# Patient Record
Sex: FEMALE | Race: WHITE | Employment: UNEMPLOYED | URBAN - METROPOLITAN AREA
[De-identification: names, ages, dates, MRNs, and addresses within clinical notes are randomized per-mention and may not be internally consistent; named-entity substitution may affect disease eponyms.]

---

## 2018-10-23 LAB
CHOLESTEROL, TOTAL: 226 MG/DL
CHOLESTEROL/HDL RATIO: 4
HDLC SERPL-MCNC: 56 MG/DL (ref 35–70)
LDL CHOLESTEROL CALCULATED: 147 MG/DL (ref 0–160)
TRIGL SERPL-MCNC: 111 MG/DL
VLDLC SERPL CALC-MCNC: NORMAL MG/DL

## 2019-06-05 RX ORDER — BUPROPION HYDROCHLORIDE 150 MG/1
150 TABLET, EXTENDED RELEASE ORAL 2 TIMES DAILY
COMMUNITY
End: 2019-07-15 | Stop reason: DRUGHIGH

## 2019-06-05 RX ORDER — PREDNISONE 10 MG/1
TABLET ORAL
COMMUNITY
Start: 2019-04-22 | End: 2019-06-10

## 2019-06-05 SDOH — HEALTH STABILITY: MENTAL HEALTH: HOW OFTEN DO YOU HAVE A DRINK CONTAINING ALCOHOL?: NEVER

## 2019-06-10 ENCOUNTER — HOSPITAL ENCOUNTER (OUTPATIENT)
Age: 44
Discharge: HOME | End: 2019-06-12
Payer: COMMERCIAL

## 2019-06-10 ENCOUNTER — OFFICE VISIT (OUTPATIENT)
Dept: FAMILY MEDICINE CLINIC | Age: 44
End: 2019-06-10
Payer: COMMERCIAL

## 2019-06-10 VITALS
OXYGEN SATURATION: 98 % | HEART RATE: 69 BPM | TEMPERATURE: 99.7 F | DIASTOLIC BLOOD PRESSURE: 78 MMHG | WEIGHT: 213 LBS | BODY MASS INDEX: 32.28 KG/M2 | SYSTOLIC BLOOD PRESSURE: 124 MMHG | HEIGHT: 68 IN

## 2019-06-10 DIAGNOSIS — F32.1 CURRENT MODERATE EPISODE OF MAJOR DEPRESSIVE DISORDER WITHOUT PRIOR EPISODE (HCC): Primary | ICD-10-CM

## 2019-06-10 DIAGNOSIS — E66.9 OBESITY (BMI 30.0-34.9): ICD-10-CM

## 2019-06-10 DIAGNOSIS — F32.1 CURRENT MODERATE EPISODE OF MAJOR DEPRESSIVE DISORDER WITHOUT PRIOR EPISODE (HCC): ICD-10-CM

## 2019-06-10 DIAGNOSIS — F41.9 ANXIETY: ICD-10-CM

## 2019-06-10 LAB
ALBUMIN SERPL-MCNC: 4.1 G/DL (ref 3.5–5.2)
ALP BLD-CCNC: 96 U/L (ref 35–104)
ALT SERPL-CCNC: 28 U/L (ref 0–32)
ANION GAP SERPL CALCULATED.3IONS-SCNC: 10 MMOL/L (ref 7–16)
AST SERPL-CCNC: 22 U/L (ref 0–31)
BILIRUB SERPL-MCNC: <0.2 MG/DL (ref 0–1.2)
BUN BLDV-MCNC: 13 MG/DL (ref 6–20)
CALCIUM SERPL-MCNC: 9.4 MG/DL (ref 8.6–10.2)
CHLORIDE BLD-SCNC: 104 MMOL/L (ref 98–107)
CO2: 26 MMOL/L (ref 22–29)
CREAT SERPL-MCNC: 0.9 MG/DL (ref 0.5–1)
GFR AFRICAN AMERICAN: >60
GFR NON-AFRICAN AMERICAN: >60 ML/MIN/1.73
GLUCOSE BLD-MCNC: 87 MG/DL (ref 74–99)
POTASSIUM SERPL-SCNC: 4.4 MMOL/L (ref 3.5–5)
SODIUM BLD-SCNC: 140 MMOL/L (ref 132–146)
TOTAL PROTEIN: 7.3 G/DL (ref 6.4–8.3)
TSH SERPL DL<=0.05 MIU/L-ACNC: 2.17 UIU/ML (ref 0.27–4.2)

## 2019-06-10 PROCEDURE — 84443 ASSAY THYROID STIM HORMONE: CPT

## 2019-06-10 PROCEDURE — 99214 OFFICE O/P EST MOD 30 MIN: CPT | Performed by: INTERNAL MEDICINE

## 2019-06-10 PROCEDURE — 80053 COMPREHEN METABOLIC PANEL: CPT

## 2019-06-10 PROCEDURE — 36415 COLL VENOUS BLD VENIPUNCTURE: CPT

## 2019-06-10 RX ORDER — FLUOXETINE HYDROCHLORIDE 20 MG/1
CAPSULE ORAL
Qty: 90 CAPSULE | Refills: 1 | Status: SHIPPED | OUTPATIENT
Start: 2019-06-10 | End: 2019-07-15 | Stop reason: DRUGHIGH

## 2019-06-10 ASSESSMENT — ENCOUNTER SYMPTOMS
BACK PAIN: 0
EYE REDNESS: 1
VOMITING: 0
DIARRHEA: 0
SHORTNESS OF BREATH: 0
ROS SKIN COMMENTS: SEE ABOVE
ABDOMINAL PAIN: 0
WHEEZING: 0
CONSTIPATION: 0
COUGH: 0
BLOOD IN STOOL: 0
NAUSEA: 0

## 2019-06-10 NOTE — PROGRESS NOTES
[id number] [employer] [facility]     6/10/19  Nisha Porter : 1975 Sex: female  Age: 53 y.o. Chief Complaint   Patient presents with    Discuss Medications     3mos   Follow-up medical problems    HPI patient presents today accompanied by her  for follow-up visit on her medical problems. As I seen her she has seen GYN. Rash that we saw last time and treated with prednisone has come back a couple times and gone. She associates it with her menstrual periods. Unusual and that it only affects the back. GYN had no answer for it. Did have an abnormality on her mammogram requiring ultrasound. Nothing of significance was found however they did recommend getting a mammogram 6 months after the last  to look for any changes given this mammogram was her first.  Is complaining of Wellbutrin not working for her.  states it tends to make her mean. She states although the last dose of Prozac was not working well it was working better than what she is on currently and wishes to go back on Prozac with potentially raising her dose slightly. Review of Systems   Constitutional: Negative for activity change, appetite change, chills, diaphoresis, fatigue, fever and unexpected weight change. HENT: Negative for congestion. Eyes: Positive for redness. Related to allergies. Respiratory: Negative for cough, shortness of breath and wheezing. Cardiovascular: Negative for chest pain, palpitations and leg swelling. Gastrointestinal: Negative for abdominal pain, blood in stool, constipation, diarrhea, nausea and vomiting. Endocrine: Negative. Genitourinary: Negative for difficulty urinating, dysuria, frequency, hematuria and urgency. She does admit to some menstrual irregularities. Again she did just see GYN. She was told everything was okay. Musculoskeletal: Negative for arthralgias, back pain, gait problem and myalgias. Skin: Positive for rash.         See above Allergic/Immunologic: Positive for environmental allergies. Negative for immunocompromised state. Neurological: Negative for dizziness, weakness, light-headedness, numbness and headaches. Hematological: Negative. Psychiatric/Behavioral: Positive for dysphoric mood. The patient is nervous/anxious. REST OF PERTINENT ROS GONE OVER AND WAS NEGATIVE. PMH:  Health Maintenance:  Mammogram - (2019)  Mammogram Screening - (2019)  Pelvic/Pap Exam - Declining  Medical Problems:  Obesity, Depression, Anxiety  (Surgeries)  FH: Father:  Lung Cancer - . Mother:  Hypothyroidism, glucose intolerance. Brother 1:  Hyperlipidemia. Sister 1:  PCOS. Sister 2:  Alcoholism. SH:  . (Marital)homemaker  Personal Habits: Cigarette Use: current cigarette smoker. Alcohol: Denies alcohol use. . (Drug  Use)    Current Outpatient Medications:     FLUoxetine (PROZAC) 20 MG capsule, Take 3 pills by mouth daily, Disp: 90 capsule, Rfl: 1    buPROPion (WELLBUTRIN SR) 150 MG extended release tablet, Take 150 mg by mouth 2 times daily, Disp: , Rfl:   Allergies   Allergen Reactions    Seasonal        Past Medical History:   Diagnosis Date    Anxiety     Depression     Obesity      History reviewed. No pertinent surgical history. History reviewed. No pertinent family history.   Social History     Socioeconomic History    Marital status:      Spouse name: Not on file    Number of children: Not on file    Years of education: Not on file    Highest education level: Not on file   Occupational History    Not on file   Social Needs    Financial resource strain: Not on file    Food insecurity:     Worry: Not on file     Inability: Not on file    Transportation needs:     Medical: Not on file     Non-medical: Not on file   Tobacco Use    Smoking status: Current Every Day Smoker     Packs/day: 1.00     Years: 16.00     Pack years: 16.00     Types: Cigarettes    Smokeless tobacco: Never Used   Substance and Sexual Activity    Alcohol use: Never     Frequency: Never    Drug use: Not on file    Sexual activity: Not on file   Lifestyle    Physical activity:     Days per week: Not on file     Minutes per session: Not on file    Stress: Not on file   Relationships    Social connections:     Talks on phone: Not on file     Gets together: Not on file     Attends Hindu service: Not on file     Active member of club or organization: Not on file     Attends meetings of clubs or organizations: Not on file     Relationship status: Not on file    Intimate partner violence:     Fear of current or ex partner: Not on file     Emotionally abused: Not on file     Physically abused: Not on file     Forced sexual activity: Not on file   Other Topics Concern    Not on file   Social History Narrative    Not on file       Vitals:    06/10/19 1045   BP: 124/78   Pulse: 69   Temp: 99.7 °F (37.6 °C)   TempSrc: Temporal   SpO2: 98%   Weight: 213 lb (96.6 kg)   Height: 5' 8\" (1.727 m)       Physical Exam   Constitutional: She is oriented to person, place, and time. She appears well-developed and well-nourished. No distress. Neck: Normal range of motion. Neck supple. No JVD present. No thyromegaly present. Cardiovascular: Normal rate, regular rhythm, normal heart sounds and intact distal pulses. Exam reveals no gallop and no friction rub. No murmur heard. Pulmonary/Chest: Effort normal and breath sounds normal. No respiratory distress. She has no wheezes. She has no rales. Abdominal: Soft. Bowel sounds are normal. She exhibits no distension. There is no tenderness. Musculoskeletal: Normal range of motion. Lymphadenopathy:     She has no cervical adenopathy. Neurological: She is alert and oriented to person, place, and time. Skin: Skin is warm and dry. No rash noted. No erythema. Psychiatric: She has a normal mood and affect. Her behavior is normal.   Nursing note and vitals reviewed.       Assessment and Plan:  Jamal Loja was seen today for discuss medications. Diagnoses and all orders for this visit:    Current moderate episode of major depressive disorder without prior episode ([address]  -     TSH without Reflex; Future  -     COMPREHENSIVE METABOLIC PANEL; Future    Anxiety    Obesity (BMI 30.0-34.9)    Other orders  -     FLUoxetine (PROZAC) 20 MG capsule; Take 3 pills by mouth daily    Plan: I will see her back in 6 weeks. At that time we will set her up for mammogram repeat to be done in October timeframe to follow-up on previous. Lab work today to monitor disease progression and medication use. We will check a limited blood count when I see her next time as well as we are weaning her off of Wellbutrin and restarting Prozac 20 mg a day for week 40 mg a day for a week then 60 mg daily. Notify us with problems in the interim. Weight loss attempts. Follow-up with GYN. Return in about 6 weeks (around 7/22/2019). Seen By:  Sami Abdi MD      *Document was created using voice recognition software. Note was reviewed however may contain grammatical errors.

## 2019-06-11 ENCOUNTER — TELEPHONE (OUTPATIENT)
Dept: PRIMARY CARE CLINIC | Age: 44
End: 2019-06-11

## 2019-07-10 RX ORDER — BUPROPION HYDROCHLORIDE 150 MG/1
150 TABLET, EXTENDED RELEASE ORAL
COMMUNITY
End: 2019-07-15 | Stop reason: DRUGHIGH

## 2019-07-10 RX ORDER — PREDNISONE 10 MG/1
TABLET ORAL
COMMUNITY
End: 2019-08-16

## 2019-07-15 ENCOUNTER — OFFICE VISIT (OUTPATIENT)
Dept: FAMILY MEDICINE CLINIC | Age: 44
End: 2019-07-15
Payer: COMMERCIAL

## 2019-07-15 VITALS
HEART RATE: 65 BPM | OXYGEN SATURATION: 96 % | DIASTOLIC BLOOD PRESSURE: 78 MMHG | WEIGHT: 204 LBS | BODY MASS INDEX: 30.92 KG/M2 | HEIGHT: 68 IN | SYSTOLIC BLOOD PRESSURE: 122 MMHG

## 2019-07-15 DIAGNOSIS — F41.9 ANXIETY: Primary | ICD-10-CM

## 2019-07-15 PROCEDURE — 99213 OFFICE O/P EST LOW 20 MIN: CPT | Performed by: INTERNAL MEDICINE

## 2019-07-15 RX ORDER — FLUOXETINE HYDROCHLORIDE 20 MG/1
60 CAPSULE ORAL DAILY
Qty: 90 CAPSULE | Refills: 5 | Status: SHIPPED | OUTPATIENT
Start: 2019-07-15 | End: 2019-11-04

## 2019-07-15 ASSESSMENT — PATIENT HEALTH QUESTIONNAIRE - PHQ9
SUM OF ALL RESPONSES TO PHQ QUESTIONS 1-9: 0
SUM OF ALL RESPONSES TO PHQ QUESTIONS 1-9: 0
SUM OF ALL RESPONSES TO PHQ9 QUESTIONS 1 & 2: 0
2. FEELING DOWN, DEPRESSED OR HOPELESS: 0
1. LITTLE INTEREST OR PLEASURE IN DOING THINGS: 0

## 2019-07-18 ENCOUNTER — PATIENT MESSAGE (OUTPATIENT)
Dept: FAMILY MEDICINE CLINIC | Age: 44
End: 2019-07-18

## 2019-08-12 ENCOUNTER — HOSPITAL ENCOUNTER (EMERGENCY)
Age: 44
Discharge: HOME | End: 2019-08-12
Attending: EMERGENCY MEDICINE
Payer: COMMERCIAL

## 2019-08-12 VITALS
WEIGHT: 195 LBS | HEIGHT: 68 IN | TEMPERATURE: 98.5 F | RESPIRATION RATE: 16 BRPM | SYSTOLIC BLOOD PRESSURE: 134 MMHG | BODY MASS INDEX: 29.55 KG/M2 | DIASTOLIC BLOOD PRESSURE: 80 MMHG | HEART RATE: 80 BPM | OXYGEN SATURATION: 98 %

## 2019-08-12 DIAGNOSIS — R10.13 DYSPEPSIA: Primary | ICD-10-CM

## 2019-08-12 DIAGNOSIS — R19.7 DIARRHEA, UNSPECIFIED TYPE: ICD-10-CM

## 2019-08-12 LAB
ALBUMIN SERPL-MCNC: 4 G/DL (ref 3.5–5.2)
ALP BLD-CCNC: 121 U/L (ref 35–104)
ALT SERPL-CCNC: 23 U/L (ref 0–32)
ANION GAP SERPL CALCULATED.3IONS-SCNC: 12 MMOL/L (ref 7–16)
AST SERPL-CCNC: 21 U/L (ref 0–31)
BACTERIA: NORMAL /HPF
BASOPHILS ABSOLUTE: 0.06 E9/L (ref 0–0.2)
BASOPHILS RELATIVE PERCENT: 0.8 % (ref 0–2)
BILIRUB SERPL-MCNC: 0.2 MG/DL (ref 0–1.2)
BILIRUBIN DIRECT: <0.2 MG/DL (ref 0–0.3)
BILIRUBIN URINE: NEGATIVE
BILIRUBIN, INDIRECT: ABNORMAL MG/DL (ref 0–1)
BLOOD, URINE: ABNORMAL
BUN BLDV-MCNC: 6 MG/DL (ref 6–20)
CALCIUM SERPL-MCNC: 9.5 MG/DL (ref 8.6–10.2)
CHLORIDE BLD-SCNC: 103 MMOL/L (ref 98–107)
CLARITY: CLEAR
CO2: 25 MMOL/L (ref 22–29)
COLOR: YELLOW
CREAT SERPL-MCNC: 0.8 MG/DL (ref 0.5–1)
EOSINOPHILS ABSOLUTE: 0.13 E9/L (ref 0.05–0.5)
EOSINOPHILS RELATIVE PERCENT: 1.7 % (ref 0–6)
GFR AFRICAN AMERICAN: >60
GFR NON-AFRICAN AMERICAN: >60 ML/MIN/1.73
GLUCOSE BLD-MCNC: 99 MG/DL (ref 74–99)
GLUCOSE URINE: NEGATIVE MG/DL
HCG(URINE) PREGNANCY TEST: NEGATIVE
HCT VFR BLD CALC: 40.5 % (ref 34–48)
HEMOGLOBIN: 12.8 G/DL (ref 11.5–15.5)
IMMATURE GRANULOCYTES #: 0.04 E9/L
IMMATURE GRANULOCYTES %: 0.5 % (ref 0–5)
KETONES, URINE: NEGATIVE MG/DL
LEUKOCYTE ESTERASE, URINE: NEGATIVE
LIPASE: 16 U/L (ref 13–60)
LYMPHOCYTES ABSOLUTE: 1.25 E9/L (ref 1.5–4)
LYMPHOCYTES RELATIVE PERCENT: 16.8 % (ref 20–42)
MCH RBC QN AUTO: 29 PG (ref 26–35)
MCHC RBC AUTO-ENTMCNC: 31.6 % (ref 32–34.5)
MCV RBC AUTO: 91.8 FL (ref 80–99.9)
MONOCYTES ABSOLUTE: 0.58 E9/L (ref 0.1–0.95)
MONOCYTES RELATIVE PERCENT: 7.8 % (ref 2–12)
NEUTROPHILS ABSOLUTE: 5.37 E9/L (ref 1.8–7.3)
NEUTROPHILS RELATIVE PERCENT: 72.4 % (ref 43–80)
NITRITE, URINE: NEGATIVE
PDW BLD-RTO: 13.1 FL (ref 11.5–15)
PH UA: 6 (ref 5–9)
PLATELET # BLD: 315 E9/L (ref 130–450)
PMV BLD AUTO: 11.3 FL (ref 7–12)
POTASSIUM REFLEX MAGNESIUM: 3.9 MMOL/L (ref 3.5–5)
PROTEIN UA: NEGATIVE MG/DL
RBC # BLD: 4.41 E12/L (ref 3.5–5.5)
RBC UA: NORMAL /HPF (ref 0–2)
SODIUM BLD-SCNC: 140 MMOL/L (ref 132–146)
SPECIFIC GRAVITY UA: <=1.005 (ref 1–1.03)
TOTAL PROTEIN: 7.4 G/DL (ref 6.4–8.3)
UROBILINOGEN, URINE: 0.2 E.U./DL
WBC # BLD: 7.4 E9/L (ref 4.5–11.5)
WBC UA: NORMAL /HPF (ref 0–5)

## 2019-08-12 PROCEDURE — 96375 TX/PRO/DX INJ NEW DRUG ADDON: CPT

## 2019-08-12 PROCEDURE — 2500000003 HC RX 250 WO HCPCS: Performed by: NURSE PRACTITIONER

## 2019-08-12 PROCEDURE — 80048 BASIC METABOLIC PNL TOTAL CA: CPT

## 2019-08-12 PROCEDURE — 96374 THER/PROPH/DIAG INJ IV PUSH: CPT

## 2019-08-12 PROCEDURE — 80076 HEPATIC FUNCTION PANEL: CPT

## 2019-08-12 PROCEDURE — 81025 URINE PREGNANCY TEST: CPT

## 2019-08-12 PROCEDURE — 85025 COMPLETE CBC W/AUTO DIFF WBC: CPT

## 2019-08-12 PROCEDURE — 2580000003 HC RX 258: Performed by: NURSE PRACTITIONER

## 2019-08-12 PROCEDURE — 99284 EMERGENCY DEPT VISIT MOD MDM: CPT

## 2019-08-12 PROCEDURE — 83690 ASSAY OF LIPASE: CPT

## 2019-08-12 PROCEDURE — 81001 URINALYSIS AUTO W/SCOPE: CPT

## 2019-08-12 PROCEDURE — 96361 HYDRATE IV INFUSION ADD-ON: CPT

## 2019-08-12 PROCEDURE — 6360000002 HC RX W HCPCS: Performed by: NURSE PRACTITIONER

## 2019-08-12 PROCEDURE — 36415 COLL VENOUS BLD VENIPUNCTURE: CPT

## 2019-08-12 PROCEDURE — 6370000000 HC RX 637 (ALT 250 FOR IP): Performed by: EMERGENCY MEDICINE

## 2019-08-12 RX ORDER — FAMOTIDINE 20 MG/1
20 TABLET, FILM COATED ORAL 2 TIMES DAILY
Qty: 28 TABLET | Refills: 0 | Status: SHIPPED | OUTPATIENT
Start: 2019-08-12 | End: 2019-08-22 | Stop reason: ALTCHOICE

## 2019-08-12 RX ORDER — 0.9 % SODIUM CHLORIDE 0.9 %
1000 INTRAVENOUS SOLUTION INTRAVENOUS ONCE
Status: COMPLETED | OUTPATIENT
Start: 2019-08-12 | End: 2019-08-12

## 2019-08-12 RX ORDER — ONDANSETRON 2 MG/ML
4 INJECTION INTRAMUSCULAR; INTRAVENOUS ONCE
Status: COMPLETED | OUTPATIENT
Start: 2019-08-12 | End: 2019-08-12

## 2019-08-12 RX ADMIN — FAMOTIDINE 20 MG: 10 INJECTION, SOLUTION INTRAVENOUS at 11:57

## 2019-08-12 RX ADMIN — LIDOCAINE HYDROCHLORIDE: 20 SOLUTION ORAL; TOPICAL at 11:57

## 2019-08-12 RX ADMIN — SODIUM CHLORIDE 1000 ML: 9 INJECTION, SOLUTION INTRAVENOUS at 11:57

## 2019-08-12 RX ADMIN — ONDANSETRON 4 MG: 2 INJECTION INTRAMUSCULAR; INTRAVENOUS at 11:57

## 2019-08-12 ASSESSMENT — PAIN DESCRIPTION - LOCATION: LOCATION: ABDOMEN

## 2019-08-12 ASSESSMENT — PAIN DESCRIPTION - ORIENTATION: ORIENTATION: MID;UPPER

## 2019-08-12 ASSESSMENT — PAIN SCALES - GENERAL: PAINLEVEL_OUTOF10: 5

## 2019-08-12 ASSESSMENT — PAIN DESCRIPTION - DESCRIPTORS: DESCRIPTORS: ACHING

## 2019-08-12 ASSESSMENT — PAIN DESCRIPTION - FREQUENCY: FREQUENCY: CONTINUOUS

## 2019-08-12 ASSESSMENT — PAIN DESCRIPTION - PAIN TYPE: TYPE: ACUTE PAIN

## 2019-08-13 ENCOUNTER — TELEPHONE (OUTPATIENT)
Dept: ADMINISTRATIVE | Age: 44
End: 2019-08-13

## 2019-08-18 ENCOUNTER — APPOINTMENT (OUTPATIENT)
Dept: GENERAL RADIOLOGY | Age: 44
End: 2019-08-18
Payer: COMMERCIAL

## 2019-08-18 ENCOUNTER — HOSPITAL ENCOUNTER (EMERGENCY)
Age: 44
Discharge: HOME | End: 2019-08-19
Attending: EMERGENCY MEDICINE
Payer: COMMERCIAL

## 2019-08-18 DIAGNOSIS — R19.7 DIARRHEA, UNSPECIFIED TYPE: Primary | ICD-10-CM

## 2019-08-18 LAB
ALBUMIN SERPL-MCNC: 3.8 G/DL (ref 3.5–5.2)
ALP BLD-CCNC: 128 U/L (ref 35–104)
ALT SERPL-CCNC: 8 U/L (ref 0–32)
ANION GAP SERPL CALCULATED.3IONS-SCNC: 12 MMOL/L (ref 7–16)
AST SERPL-CCNC: 27 U/L (ref 0–31)
BACTERIA: ABNORMAL /HPF
BASOPHILS ABSOLUTE: 0.14 E9/L (ref 0–0.2)
BASOPHILS RELATIVE PERCENT: 1 % (ref 0–2)
BILIRUB SERPL-MCNC: 0.4 MG/DL (ref 0–1.2)
BILIRUBIN URINE: NEGATIVE
BLOOD, URINE: NEGATIVE
BUN BLDV-MCNC: 5 MG/DL (ref 6–20)
BURR CELLS: ABNORMAL
CALCIUM SERPL-MCNC: 9.1 MG/DL (ref 8.6–10.2)
CHLORIDE BLD-SCNC: 100 MMOL/L (ref 98–107)
CLARITY: CLEAR
CO2: 23 MMOL/L (ref 22–29)
COLOR: YELLOW
CREAT SERPL-MCNC: 0.8 MG/DL (ref 0.5–1)
EOSINOPHILS ABSOLUTE: 0 E9/L (ref 0.05–0.5)
EOSINOPHILS RELATIVE PERCENT: 0 % (ref 0–6)
EPITHELIAL CELLS, UA: ABNORMAL /HPF
GFR AFRICAN AMERICAN: >60
GFR NON-AFRICAN AMERICAN: >60 ML/MIN/1.73
GLUCOSE BLD-MCNC: 138 MG/DL (ref 74–99)
GLUCOSE URINE: NEGATIVE MG/DL
HCG, URINE, POC: NEGATIVE
HCT VFR BLD CALC: 38.5 % (ref 34–48)
HEMOGLOBIN: 12.8 G/DL (ref 11.5–15.5)
KETONES, URINE: NEGATIVE MG/DL
LEUKOCYTE ESTERASE, URINE: ABNORMAL
LIPASE: 11 U/L (ref 13–60)
LYMPHOCYTES ABSOLUTE: 1.35 E9/L (ref 1.5–4)
LYMPHOCYTES RELATIVE PERCENT: 10 % (ref 20–42)
Lab: NORMAL
MCH RBC QN AUTO: 29.9 PG (ref 26–35)
MCHC RBC AUTO-ENTMCNC: 33.2 % (ref 32–34.5)
MCV RBC AUTO: 90 FL (ref 80–99.9)
MONOCYTES ABSOLUTE: 1.08 E9/L (ref 0.1–0.95)
MONOCYTES RELATIVE PERCENT: 8 % (ref 2–12)
NEGATIVE QC PASS/FAIL: NORMAL
NEUTROPHILS ABSOLUTE: 10.94 E9/L (ref 1.8–7.3)
NEUTROPHILS RELATIVE PERCENT: 81 % (ref 43–80)
NITRITE, URINE: NEGATIVE
PDW BLD-RTO: 12.9 FL (ref 11.5–15)
PH UA: 6.5 (ref 5–9)
PLATELET # BLD: 379 E9/L (ref 130–450)
PMV BLD AUTO: 10.6 FL (ref 7–12)
POIKILOCYTES: ABNORMAL
POSITIVE QC PASS/FAIL: NORMAL
POTASSIUM REFLEX MAGNESIUM: 4.1 MMOL/L (ref 3.5–5)
PROTEIN UA: NEGATIVE MG/DL
RBC # BLD: 4.28 E12/L (ref 3.5–5.5)
RBC UA: ABNORMAL /HPF (ref 0–2)
SODIUM BLD-SCNC: 135 MMOL/L (ref 132–146)
SPECIFIC GRAVITY UA: <=1.005 (ref 1–1.03)
TOTAL PROTEIN: 7.1 G/DL (ref 6.4–8.3)
UROBILINOGEN, URINE: 0.2 E.U./DL
WBC # BLD: 13.5 E9/L (ref 4.5–11.5)
WBC UA: ABNORMAL /HPF (ref 0–5)

## 2019-08-18 PROCEDURE — 6360000002 HC RX W HCPCS: Performed by: STUDENT IN AN ORGANIZED HEALTH CARE EDUCATION/TRAINING PROGRAM

## 2019-08-18 PROCEDURE — 96374 THER/PROPH/DIAG INJ IV PUSH: CPT

## 2019-08-18 PROCEDURE — 2580000003 HC RX 258: Performed by: STUDENT IN AN ORGANIZED HEALTH CARE EDUCATION/TRAINING PROGRAM

## 2019-08-18 PROCEDURE — 85025 COMPLETE CBC W/AUTO DIFF WBC: CPT

## 2019-08-18 PROCEDURE — 81001 URINALYSIS AUTO W/SCOPE: CPT

## 2019-08-18 PROCEDURE — 96372 THER/PROPH/DIAG INJ SC/IM: CPT

## 2019-08-18 PROCEDURE — 80053 COMPREHEN METABOLIC PANEL: CPT

## 2019-08-18 PROCEDURE — 71045 X-RAY EXAM CHEST 1 VIEW: CPT

## 2019-08-18 PROCEDURE — 6360000002 HC RX W HCPCS: Performed by: EMERGENCY MEDICINE

## 2019-08-18 PROCEDURE — 96361 HYDRATE IV INFUSION ADD-ON: CPT

## 2019-08-18 PROCEDURE — 93005 ELECTROCARDIOGRAM TRACING: CPT | Performed by: STUDENT IN AN ORGANIZED HEALTH CARE EDUCATION/TRAINING PROGRAM

## 2019-08-18 PROCEDURE — 99284 EMERGENCY DEPT VISIT MOD MDM: CPT

## 2019-08-18 PROCEDURE — 83690 ASSAY OF LIPASE: CPT

## 2019-08-18 RX ORDER — 0.9 % SODIUM CHLORIDE 0.9 %
1000 INTRAVENOUS SOLUTION INTRAVENOUS ONCE
Status: COMPLETED | OUTPATIENT
Start: 2019-08-18 | End: 2019-08-18

## 2019-08-18 RX ORDER — PHENTOLAMINE MESYLATE 5 MG/1
5 INJECTION INTRAMUSCULAR; INTRAVENOUS ONCE
Status: DISCONTINUED | OUTPATIENT
Start: 2019-08-18 | End: 2019-08-18

## 2019-08-18 RX ORDER — PROMETHAZINE HYDROCHLORIDE 25 MG/ML
25 INJECTION, SOLUTION INTRAMUSCULAR; INTRAVENOUS ONCE
Status: COMPLETED | OUTPATIENT
Start: 2019-08-18 | End: 2019-08-18

## 2019-08-18 RX ORDER — KETOROLAC TROMETHAMINE 30 MG/ML
30 INJECTION, SOLUTION INTRAMUSCULAR; INTRAVENOUS ONCE
Status: COMPLETED | OUTPATIENT
Start: 2019-08-18 | End: 2019-08-18

## 2019-08-18 RX ADMIN — KETOROLAC TROMETHAMINE 30 MG: 30 INJECTION, SOLUTION INTRAMUSCULAR at 22:25

## 2019-08-18 RX ADMIN — SODIUM CHLORIDE 1000 ML: 9 INJECTION, SOLUTION INTRAVENOUS at 21:14

## 2019-08-18 RX ADMIN — PROMETHAZINE HYDROCHLORIDE 25 MG: 25 INJECTION INTRAMUSCULAR; INTRAVENOUS at 21:46

## 2019-08-18 ASSESSMENT — PAIN SCALES - GENERAL
PAINLEVEL_OUTOF10: 7
PAINLEVEL_OUTOF10: 4
PAINLEVEL_OUTOF10: 3

## 2019-08-18 ASSESSMENT — PAIN DESCRIPTION - PAIN TYPE
TYPE: ACUTE PAIN
TYPE: ACUTE PAIN

## 2019-08-18 ASSESSMENT — PAIN DESCRIPTION - DESCRIPTORS: DESCRIPTORS: SHARP

## 2019-08-18 ASSESSMENT — PAIN DESCRIPTION - LOCATION
LOCATION: SHOULDER
LOCATION: ABDOMEN

## 2019-08-18 ASSESSMENT — PAIN DESCRIPTION - ONSET: ONSET: SUDDEN

## 2019-08-18 ASSESSMENT — PAIN DESCRIPTION - PROGRESSION: CLINICAL_PROGRESSION: GRADUALLY WORSENING

## 2019-08-18 ASSESSMENT — PAIN DESCRIPTION - ORIENTATION: ORIENTATION: RIGHT

## 2019-08-18 ASSESSMENT — PAIN DESCRIPTION - FREQUENCY: FREQUENCY: INTERMITTENT

## 2019-08-18 ASSESSMENT — PAIN - FUNCTIONAL ASSESSMENT: PAIN_FUNCTIONAL_ASSESSMENT: ACTIVITIES ARE NOT PREVENTED

## 2019-08-19 VITALS
HEIGHT: 68 IN | DIASTOLIC BLOOD PRESSURE: 58 MMHG | OXYGEN SATURATION: 96 % | SYSTOLIC BLOOD PRESSURE: 99 MMHG | BODY MASS INDEX: 28.79 KG/M2 | TEMPERATURE: 98.2 F | RESPIRATION RATE: 18 BRPM | HEART RATE: 61 BPM | WEIGHT: 190 LBS

## 2019-08-19 DIAGNOSIS — R11.0 NAUSEA: Primary | ICD-10-CM

## 2019-08-19 LAB
C DIFF TOXIN/ANTIGEN: NORMAL
EKG ATRIAL RATE: 96 BPM
EKG P AXIS: 56 DEGREES
EKG P-R INTERVAL: 136 MS
EKG Q-T INTERVAL: 338 MS
EKG QRS DURATION: 62 MS
EKG QTC CALCULATION (BAZETT): 427 MS
EKG R AXIS: 43 DEGREES
EKG T AXIS: 66 DEGREES
EKG VENTRICULAR RATE: 96 BPM
GIARDIA ANTIGEN STOOL: NORMAL
OCCULT BLOOD SCREENING: NORMAL
ROTAVIRUS ANTIGEN: NORMAL

## 2019-08-19 PROCEDURE — G0328 FECAL BLOOD SCRN IMMUNOASSAY: HCPCS

## 2019-08-19 PROCEDURE — 87425 ROTAVIRUS AG IA: CPT

## 2019-08-19 PROCEDURE — 87329 GIARDIA AG IA: CPT

## 2019-08-19 PROCEDURE — 87449 NOS EACH ORGANISM AG IA: CPT

## 2019-08-19 PROCEDURE — 82705 FATS/LIPIDS FECES QUAL: CPT

## 2019-08-19 PROCEDURE — 87324 CLOSTRIDIUM AG IA: CPT

## 2019-08-19 PROCEDURE — 87045 FECES CULTURE AEROBIC BACT: CPT

## 2019-08-19 RX ORDER — PROMETHAZINE HYDROCHLORIDE 12.5 MG/1
12.5 TABLET ORAL 3 TIMES DAILY PRN
Qty: 15 TABLET | Refills: 0 | Status: SHIPPED | OUTPATIENT
Start: 2019-08-19 | End: 2019-08-26

## 2019-08-19 RX ORDER — ONDANSETRON 4 MG/1
4 TABLET, FILM COATED ORAL EVERY 8 HOURS PRN
Qty: 21 TABLET | Refills: 0 | Status: CANCELLED | OUTPATIENT
Start: 2019-08-19 | End: 2019-08-26

## 2019-08-19 RX ORDER — ONDANSETRON 4 MG/1
4 TABLET, FILM COATED ORAL EVERY 8 HOURS PRN
Qty: 21 TABLET | Refills: 0 | Status: SHIPPED | OUTPATIENT
Start: 2019-08-19 | End: 2019-08-22 | Stop reason: ALTCHOICE

## 2019-08-19 ASSESSMENT — ENCOUNTER SYMPTOMS
PHOTOPHOBIA: 0
VOMITING: 0
CHEST TIGHTNESS: 0
SHORTNESS OF BREATH: 0
RHINORRHEA: 0
ABDOMINAL PAIN: 0
BACK PAIN: 0
CONSTIPATION: 0
APNEA: 0
NAUSEA: 0
EYE PAIN: 0
SORE THROAT: 0
DIARRHEA: 1
COUGH: 0
TROUBLE SWALLOWING: 0
WHEEZING: 0

## 2019-08-19 NOTE — ED PROVIDER NOTES
together: None     Attends Sabianism service: None     Active member of club or organization: None     Attends meetings of clubs or organizations: None     Relationship status: None    Intimate partner violence:     Fear of current or ex partner: None     Emotionally abused: None     Physically abused: None     Forced sexual activity: None   Other Topics Concern    None   Social History Narrative    None       SCREENINGS    [place] Coma Scale  Eye Opening: Spontaneous  Best Verbal Response: Oriented  Best Motor Response: Obeys commands  [place] Coma Scale Score: 15         Physical Exam   Constitutional: She is oriented to person, place, and time. She appears well-developed and well-nourished. No distress. Awake and alert no obvious distress sitting in the gurney. HENT:   Head: Normocephalic and atraumatic. Right Ear: External ear normal.   Left Ear: External ear normal.   Mildly dry mucous membranes. Eyes: Pupils are equal, round, and reactive to light. EOM are normal. No scleral icterus. Neck: Normal range of motion. Neck supple. Cardiovascular: Normal rate, regular rhythm and intact distal pulses. No murmur heard. Pulmonary/Chest: Effort normal and breath sounds normal. No respiratory distress. She has no wheezes. Abdominal: Soft. There is no tenderness. There is no rebound and no guarding. Musculoskeletal: Normal range of motion. She exhibits no edema, tenderness or deformity. No CVA tenderness. Neurological: She is alert and oriented to person, place, and time. No sensory deficit. She exhibits normal muscle tone. Skin: Skin is warm and dry. Capillary refill takes less than 2 seconds. Psychiatric: She has a normal mood and affect. Her behavior is normal.   Nursing note and vitals reviewed.       Procedures    MDM  This is a 12-year-old female with history of depression who presents to the emergency department with her  with chief complaint of diarrhea that has been ongoing for the last 2 months, described as nonbloody, with no associated abdominal pain. She is an appointment with her PCP in 4 days for further evaluation of this diarrhea. She was seen in the emergency department a week ago with similar complaints and a work-up at that point was negative. She has not had ultrasound or CT scan, and she has never had a colonoscopy or endoscopic exam.  She denies any recent antibiotic use to suggest C. difficile. She has not gone camping or drink from suspicious water sources suggest Giardia. She denies any history of HIV. She also has any suspicious foods nor had bloody diarrhea to suggest ongoing bacterial cause such as Shigella, EHEC, Salmonella, etc.  [place] department she initially had a heart rate of 97 bpm and appeared mildly dry on exam.  Administered 1 L of fluids normal saline with improvement of her heart rate to 74 bpm.  She was afebrile. Her abdominal exam is completely benign, the abdomen was soft and nontender throughout. There is no rebound or guarding. Negative McBurney's point, and negative Nation sign. There is no CVA tenderness bilaterally. Metabolic panel showed normal electrolytes, normal renal function with a creatinine of 0.8. Her glucose is mildly elevated 138. Alk phos is 128. Bilirubin was normal as were her liver function enzymes and her lipase, not consistent with acute pancreatitis or obstructive biliary process. She is afebrile and nontender and this is not likely cholecystitis. She did have a mild leukocytosis of 13.5, though this is nonspecific. She is completely nontender and is not felt that additional imaging was warranted given her benign exam and reassuring lab results as stated above. We were able to obtain a stool culture sample and this will be tested for Giardia, C. difficile, occult blood, fat content, etc.  She has close follow-up with PCP in the next 3 to 4 days.   She likely needs to have a colonoscopy as it is possible that she Metabolic Panel w/ Reflex to MG   Result Value Ref Range    Sodium 135 132 - 146 mmol/L    Potassium reflex Magnesium 4.1 3.5 - 5.0 mmol/L    Chloride 100 98 - 107 mmol/L    CO2 23 22 - 29 mmol/L    Anion Gap 12 7 - 16 mmol/L    Glucose 138 (H) 74 - 99 mg/dL    BUN 5 (L) 6 - 20 mg/dL    CREATININE 0.8 0.5 - 1.0 mg/dL    GFR Non-African American >60 >=60 mL/min/1.73    GFR African American >60     Calcium 9.1 8.6 - 10.2 mg/dL    Total Protein 7.1 6.4 - 8.3 g/dL    Alb 3.8 3.5 - 5.2 g/dL    Total Bilirubin 0.4 0.0 - 1.2 mg/dL    Alkaline Phosphatase 128 (H) 35 - 104 U/L    ALT 8 0 - 32 U/L    AST 27 0 - 31 U/L   Lipase   Result Value Ref Range    Lipase 11 (L) 13 - 60 U/L   Urinalysis, reflex to microscopic   Result Value Ref Range    Color, UA Yellow Straw/Yellow    Clarity, UA Clear Clear    Glucose, Ur Negative Negative mg/dL    Bilirubin Urine Negative Negative    Ketones, Urine Negative Negative mg/dL    Specific Gravity, UA <=1.005 1.005 - 1.030    Blood, Urine Negative Negative    pH, UA 6.5 5.0 - 9.0    Protein, UA Negative Negative mg/dL    Urobilinogen, Urine 0.2 <2.0 E.U./dL    Nitrite, Urine Negative Negative    Leukocyte Esterase, Urine TRACE (A) Negative   Microscopic Urinalysis   Result Value Ref Range    WBC, UA 1-3 0 - 5 /HPF    RBC, UA NONE 0 - 2 /HPF    Epi Cells RARE /HPF    Bacteria, UA RARE (A) /HPF   POC Pregnancy Urine   Result Value Ref Range    HCG, Urine, POC Negative Negative    Lot Number [id number]     Positive QC Pass/Fail Pass     Negative QC Pass/Fail Pass    EKG 12 Lead   Result Value Ref Range    Ventricular Rate 96 BPM    Atrial Rate 96 BPM    P-R Interval 136 ms    QRS Duration 62 ms    Q-T Interval 338 ms    QTc Calculation (Bazett) 427 ms    P Axis 56 degrees    R Axis 43 degrees    T Axis 66 degrees       Radiology:  XR CHEST PORTABLE   Final Result      No airspace opacities or pleural effusion.                ------------------------- NURSING NOTES AND VITALS REVIEWED

## 2019-08-21 LAB — CULTURE, STOOL: NORMAL

## 2019-08-22 ENCOUNTER — OFFICE VISIT (OUTPATIENT)
Dept: FAMILY MEDICINE CLINIC | Age: 44
End: 2019-08-22
Payer: COMMERCIAL

## 2019-08-22 VITALS
SYSTOLIC BLOOD PRESSURE: 104 MMHG | WEIGHT: 192 LBS | OXYGEN SATURATION: 99 % | DIASTOLIC BLOOD PRESSURE: 62 MMHG | HEART RATE: 112 BPM | BODY MASS INDEX: 29.19 KG/M2

## 2019-08-22 DIAGNOSIS — R63.4 WEIGHT LOSS: ICD-10-CM

## 2019-08-22 DIAGNOSIS — R11.0 NAUSEA: ICD-10-CM

## 2019-08-22 DIAGNOSIS — F33.9 RECURRENT MAJOR DEPRESSIVE DISORDER, REMISSION STATUS UNSPECIFIED (HCC): ICD-10-CM

## 2019-08-22 DIAGNOSIS — R19.7 DIARRHEA, UNSPECIFIED TYPE: Primary | ICD-10-CM

## 2019-08-22 LAB
FECAL NEUTRAL FAT: NORMAL
FECAL SPLIT FATS: NORMAL

## 2019-08-22 PROCEDURE — 99214 OFFICE O/P EST MOD 30 MIN: CPT | Performed by: INTERNAL MEDICINE

## 2019-08-25 NOTE — PROGRESS NOTES
file    Stress: Not on file   Relationships    Social connections:     Talks on phone: Not on file     Gets together: Not on file     Attends Lutheran service: Not on file     Active member of club or organization: Not on file     Attends meetings of clubs or organizations: Not on file     Relationship status: Not on file    Intimate partner violence:     Fear of current or ex partner: Not on file     Emotionally abused: Not on file     Physically abused: Not on file     Forced sexual activity: Not on file   Other Topics Concern    Not on file   Social History Narrative    Not on file       Vitals:    08/22/19 0825   BP: 104/62   Pulse: 112   SpO2: 99%   Weight: 192 lb (87.1 kg)       Physical Exam  Constitutional: She is oriented to person, place, and time. She appears well-developed and well-nourished. No distress. Neck: Normal range of motion. Neck supple. No JVD present. No thyromegaly present. Cardiovascular: Normal rate, regular rhythm, normal heart sounds and intact distal pulses. Exam reveals no gallop and no friction rub.   No murmur heard. Pulmonary/Chest: Effort normal and breath sounds normal. No respiratory distress. She has no wheezes. She has no rales. Abdominal: Soft. Bowel sounds are normal. She exhibits no distension. There is no tenderness. Musculoskeletal: Normal range of motion. Lymphadenopathy:     She has no cervical axillary or inguinal adenopathy. Neurological: She is alert and oriented to person, place, and time. Skin: Skin is warm and dry. No rash noted. No erythema. Psychiatric: She has a normal mood and affect. Her behavior is normal.   Nursing note and vitals reviewed. Assessment and Plan:  Celi Hernandez was seen today for diarrhea.     Diagnoses and all orders for this visit:    Diarrhea, unspecified type  -     Cancel: External Referral To Gastroenterology  -     External Referral To Gastroenterology    Nausea  -     Cancel: External Referral To

## 2019-09-15 ENCOUNTER — HOSPITAL ENCOUNTER (INPATIENT)
Age: 44
LOS: 5 days | Discharge: HOME | DRG: 436 | End: 2019-09-20
Attending: INTERNAL MEDICINE | Admitting: INTERNAL MEDICINE
Payer: COMMERCIAL

## 2019-09-15 ENCOUNTER — APPOINTMENT (OUTPATIENT)
Dept: GENERAL RADIOLOGY | Age: 44
DRG: 436 | End: 2019-09-15
Attending: INTERNAL MEDICINE
Payer: COMMERCIAL

## 2019-09-15 PROBLEM — C7B.00 METASTATIC CARCINOID TUMOR (HCC): Status: ACTIVE | Noted: 2019-09-15

## 2019-09-15 LAB
ALBUMIN SERPL-MCNC: 3.8 G/DL (ref 3.5–5.2)
ALP BLD-CCNC: 124 U/L (ref 35–104)
ALT SERPL-CCNC: 30 U/L (ref 0–32)
ANION GAP SERPL CALCULATED.3IONS-SCNC: 8 MMOL/L (ref 7–16)
AST SERPL-CCNC: 31 U/L (ref 0–31)
BILIRUB SERPL-MCNC: 0.3 MG/DL (ref 0–1.2)
BUN BLDV-MCNC: 6 MG/DL (ref 6–20)
CALCIUM SERPL-MCNC: 8.9 MG/DL (ref 8.6–10.2)
CHLORIDE BLD-SCNC: 106 MMOL/L (ref 98–107)
CO2: 24 MMOL/L (ref 22–29)
CREAT SERPL-MCNC: 0.8 MG/DL (ref 0.5–1)
GFR AFRICAN AMERICAN: >60
GFR NON-AFRICAN AMERICAN: >60 ML/MIN/1.73
GLUCOSE BLD-MCNC: 104 MG/DL (ref 74–99)
HCT VFR BLD CALC: 37.7 % (ref 34–48)
HEMOGLOBIN: 12 G/DL (ref 11.5–15.5)
INR BLD: 1
MCH RBC QN AUTO: 28.6 PG (ref 26–35)
MCHC RBC AUTO-ENTMCNC: 31.8 % (ref 32–34.5)
MCV RBC AUTO: 89.8 FL (ref 80–99.9)
PDW BLD-RTO: 13.8 FL (ref 11.5–15)
PLATELET # BLD: 286 E9/L (ref 130–450)
PMV BLD AUTO: 11.2 FL (ref 7–12)
POTASSIUM SERPL-SCNC: 3.7 MMOL/L (ref 3.5–5)
PROTHROMBIN TIME: 12.4 SEC (ref 9.3–12.4)
RBC # BLD: 4.2 E12/L (ref 3.5–5.5)
SODIUM BLD-SCNC: 138 MMOL/L (ref 132–146)
TOTAL PROTEIN: 6.7 G/DL (ref 6.4–8.3)
WBC # BLD: 9.4 E9/L (ref 4.5–11.5)

## 2019-09-15 PROCEDURE — 36415 COLL VENOUS BLD VENIPUNCTURE: CPT

## 2019-09-15 PROCEDURE — 80053 COMPREHEN METABOLIC PANEL: CPT

## 2019-09-15 PROCEDURE — 83497 ASSAY OF 5-HIAA: CPT

## 2019-09-15 PROCEDURE — 93005 ELECTROCARDIOGRAM TRACING: CPT | Performed by: INTERNAL MEDICINE

## 2019-09-15 PROCEDURE — 6370000000 HC RX 637 (ALT 250 FOR IP): Performed by: INTERNAL MEDICINE

## 2019-09-15 PROCEDURE — 71046 X-RAY EXAM CHEST 2 VIEWS: CPT

## 2019-09-15 PROCEDURE — 85610 PROTHROMBIN TIME: CPT

## 2019-09-15 PROCEDURE — 85027 COMPLETE CBC AUTOMATED: CPT

## 2019-09-15 PROCEDURE — 1200000000 HC SEMI PRIVATE

## 2019-09-15 RX ORDER — SODIUM CHLORIDE 0.9 % (FLUSH) 0.9 %
10 SYRINGE (ML) INJECTION 2 TIMES DAILY
Status: DISCONTINUED | OUTPATIENT
Start: 2019-09-15 | End: 2019-09-20 | Stop reason: HOSPADM

## 2019-09-15 RX ORDER — FLUOXETINE HYDROCHLORIDE 20 MG/1
20 CAPSULE ORAL DAILY
Status: DISCONTINUED | OUTPATIENT
Start: 2019-09-15 | End: 2019-09-20 | Stop reason: HOSPADM

## 2019-09-15 RX ORDER — ACETAMINOPHEN 500 MG
500 TABLET ORAL EVERY 6 HOURS PRN
Status: DISCONTINUED | OUTPATIENT
Start: 2019-09-15 | End: 2019-09-20 | Stop reason: HOSPADM

## 2019-09-15 RX ADMIN — FLUOXETINE 20 MG: 20 CAPSULE ORAL at 21:10

## 2019-09-15 ASSESSMENT — PAIN SCALES - GENERAL
PAINLEVEL_OUTOF10: 0

## 2019-09-15 ASSESSMENT — PAIN - FUNCTIONAL ASSESSMENT: PAIN_FUNCTIONAL_ASSESSMENT: ACTIVITIES ARE NOT PREVENTED

## 2019-09-16 PROCEDURE — 2580000003 HC RX 258: Performed by: INTERNAL MEDICINE

## 2019-09-16 PROCEDURE — 6370000000 HC RX 637 (ALT 250 FOR IP): Performed by: INTERNAL MEDICINE

## 2019-09-16 PROCEDURE — 1200000000 HC SEMI PRIVATE

## 2019-09-16 PROCEDURE — 6360000002 HC RX W HCPCS: Performed by: INTERNAL MEDICINE

## 2019-09-16 RX ORDER — OCTREOTIDE ACETATE 50 UG/ML
50 INJECTION, SOLUTION INTRAVENOUS; SUBCUTANEOUS EVERY 8 HOURS
Status: DISCONTINUED | OUTPATIENT
Start: 2019-09-16 | End: 2019-09-20 | Stop reason: HOSPADM

## 2019-09-16 RX ADMIN — OCTREOTIDE ACETATE 50 MCG: 50 INJECTION, SOLUTION INTRAVENOUS; SUBCUTANEOUS at 20:58

## 2019-09-16 RX ADMIN — Medication 10 ML: at 23:03

## 2019-09-16 RX ADMIN — Medication 10 ML: at 08:18

## 2019-09-16 RX ADMIN — FLUOXETINE 20 MG: 20 CAPSULE ORAL at 20:58

## 2019-09-16 RX ADMIN — OCTREOTIDE ACETATE 50 MCG: 50 INJECTION, SOLUTION INTRAVENOUS; SUBCUTANEOUS at 12:49

## 2019-09-16 ASSESSMENT — PAIN SCALES - GENERAL
PAINLEVEL_OUTOF10: 0
PAINLEVEL_OUTOF10: 0

## 2019-09-16 NOTE — PATIENT CARE CONFERENCE
[facility] Quality Flow/Interdisciplinary Rounds Progress Note        Quality Flow Rounds held on September 16, 2019    Disciplines Attending:  Bedside Nurse, ,  and Nursing Unit [facility] Panda Porter was admitted on 9/15/2019  3:26 PM    Anticipated Discharge Date:  Expected Discharge Date: 09/18/19    Disposition:    Denilson Score:  Denilson Scale Score: 22    Readmission Risk              Risk of Unplanned Readmission:        7           Discussed patient goal for the day, patient clinical progression, and barriers to discharge. The following Goal(s) of the Day/Commitment(s) have been identified:  Comfort measures.       Isidro Mckeon  September 16, 2019

## 2019-09-16 NOTE — H&P
[address] [address]                              HISTORY AND PHYSICAL    PATIENT NAME: Brenna Molina                    :        1975  MED REC NO:   [id number]                            ROOM:       0701  ACCOUNT NO:   [de-identified]                           ADMIT DATE: 09/15/2019  PROVIDER:     Erin Hernández MD    DATE OF ADMISSION:  09/15/2019    REFERRING PROVIDER:  Rina Oliveira MD.    REASON FOR ADMISSION:  Carcinoid syndrome. HISTORY OF PRESENT ILLNESS:  This is a 49-year-old woman who really has  no significant past medical history other than depression. She had been  on Prozac at a dose of 40 mg per day for about four years. It was the  impression that it had ceased working, so this was weaned and Wellbutrin  initiated. However, it was ineffective and the decision was made to  resume Prozac at a dose of 60 mg. It was ultimately decreased to the  current dose of 40 mg. It was at about that transition time from  Wellbutrin to Prozac that symptoms emerged. Diarrhea, five or more  stools a day. Watery, nonbloody, without pain or fever. .  Decline in  appetite with intermittent nausea, but no vomiting followed. Lab work remarkable  on more than one occasion for a normal CBC and comprehensive metabolic  panel with the exception of a minimally elevated alkaline phosphatase. Family hx negative for inflammatory bowel disease. Symptoms prompted two separate emergency room visits where lab work was  Normal.  Stools were assessed for  pathogens without findings. I saw her for the first time on the . At that time, she gave a history of flushing. It was not a striking  complaint, but it was present. She  ultimately had photographs by her   of the flushing episodes and it was certainly typical of a  carcinoid flush. Face and trunk. Transient.   When I saw her in the  office although I was not terribly suspicious before viewing the  photographs, I did order a chromogranin A, which returned at a value of  16,000. She was assessed for celiac disease with negative results. Now, strongly suspicious of midgut carcinoid a  CAT scan was done,  [place]. Innumerable liver metastasis from a  lesion in the  terminal ileum questionably involving the cecum consistent with a  metastatic carcinoid. She remains on Prozac. Dr. Raúl Winkler has initiated a  taper. PAST MEDICAL HISTORY:  Remarkable only for depression. PAST SURGICAL HISTORY:  No surgical interventions. FAMILY HISTORY:  Negative for relevance. Mother has a history of  hypothyroidism and glucose intolerance. Father had lung cancer and  . One brother with hyperlipidemia, sister with polycystic  ovaries, and a second sister reportedly with problems related to  alcohol. SOCIAL HISTORY:  She is a homemaker, just quit smoking a couple of weeks  ago. Denies alcohol. OBJECTIVE:  VITAL SIGNS:  Pulse is regular, respirations 16, blood pressure 112/60. Room air saturation 99%. She is afebrile. HEENT:  Eyes:  She is without telangiectasias and no current flush. GENERAL:  Alert and oriented. NECK:  Veins flat. Lymph nodes lacking. HEART:  Tones normal.  LUNGS:  Fields are clear. ABDOMEN:  Without palpable or percussible organomegaly. No tenderness. No free fluids. SKIN:  Clear. EXTREMITIES:  Joints normal.  No edema. ASSESSMENT AND PLAN:  Symptomatic, metastatic neuroendocrine tumor  consistent with carcinoid syndrome. Innumerable liver metastasis. Minimally abnormal liver function studies. Whether SSRI's actually  accentuate carcinoid and unmasked it is debatable. Nonetheless, will wean. Will initiate Sandostatin as I see no  contraindication to doing so. EKG first to check QT interval. Anticipating liver biopsy. Heme/Oncologyconsultation. Questionable colonoscopy/resection.         Sunni Mccall MD    D: 09/15/2019 20:58:33       T: 09/16/2019 0:15:29     JUAN [email or web address]  Job#: [id number]     Doc#: [id number]    CC:

## 2019-09-17 ENCOUNTER — APPOINTMENT (OUTPATIENT)
Dept: CT IMAGING | Age: 44
DRG: 436 | End: 2019-09-17
Attending: INTERNAL MEDICINE
Payer: COMMERCIAL

## 2019-09-17 PROCEDURE — 82024 ASSAY OF ACTH: CPT

## 2019-09-17 PROCEDURE — 2580000003 HC RX 258: Performed by: INTERNAL MEDICINE

## 2019-09-17 PROCEDURE — 88342 IMHCHEM/IMCYTCHM 1ST ANTB: CPT

## 2019-09-17 PROCEDURE — 6360000002 HC RX W HCPCS: Performed by: INTERNAL MEDICINE

## 2019-09-17 PROCEDURE — 1200000000 HC SEMI PRIVATE

## 2019-09-17 PROCEDURE — 84260 ASSAY OF SEROTONIN: CPT

## 2019-09-17 PROCEDURE — 6370000000 HC RX 637 (ALT 250 FOR IP): Performed by: INTERNAL MEDICINE

## 2019-09-17 PROCEDURE — 0FB03ZX EXCISION OF LIVER, PERCUTANEOUS APPROACH, DIAGNOSTIC: ICD-10-PCS | Performed by: INTERNAL MEDICINE

## 2019-09-17 PROCEDURE — 88341 IMHCHEM/IMCYTCHM EA ADD ANTB: CPT

## 2019-09-17 PROCEDURE — 36415 COLL VENOUS BLD VENIPUNCTURE: CPT

## 2019-09-17 PROCEDURE — 2709999900 CT NEEDLE BIOPSY LIVER PERCUTANEOUS

## 2019-09-17 PROCEDURE — 88307 TISSUE EXAM BY PATHOLOGIST: CPT

## 2019-09-17 PROCEDURE — 77012 CT SCAN FOR NEEDLE BIOPSY: CPT

## 2019-09-17 PROCEDURE — 86316 IMMUNOASSAY TUMOR OTHER: CPT

## 2019-09-17 RX ORDER — LIDOCAINE HYDROCHLORIDE 20 MG/ML
20 INJECTION, SOLUTION EPIDURAL; INFILTRATION; INTRACAUDAL; PERINEURAL ONCE
Status: DISCONTINUED | OUTPATIENT
Start: 2019-09-17 | End: 2019-09-20 | Stop reason: HOSPADM

## 2019-09-17 RX ORDER — LORAZEPAM 1 MG/1
1 TABLET ORAL EVERY 6 HOURS PRN
Status: DISCONTINUED | OUTPATIENT
Start: 2019-09-17 | End: 2019-09-20 | Stop reason: HOSPADM

## 2019-09-17 RX ORDER — SODIUM CHLORIDE 9 MG/ML
INJECTION, SOLUTION INTRAVENOUS CONTINUOUS
Status: DISCONTINUED | OUTPATIENT
Start: 2019-09-17 | End: 2019-09-18

## 2019-09-17 RX ORDER — HYDROCODONE BITARTRATE AND ACETAMINOPHEN 5; 325 MG/1; MG/1
1 TABLET ORAL EVERY 4 HOURS PRN
Status: DISCONTINUED | OUTPATIENT
Start: 2019-09-17 | End: 2019-09-20 | Stop reason: HOSPADM

## 2019-09-17 RX ORDER — OCTREOTIDE ACETATE 50 UG/ML
400 INJECTION, SOLUTION INTRAVENOUS; SUBCUTANEOUS ONCE
Status: DISCONTINUED | OUTPATIENT
Start: 2019-09-17 | End: 2019-09-17 | Stop reason: CLARIF

## 2019-09-17 RX ADMIN — HYDROCODONE BITARTRATE AND ACETAMINOPHEN 1 TABLET: 5; 325 TABLET ORAL at 14:26

## 2019-09-17 RX ADMIN — SODIUM CHLORIDE: 9 INJECTION, SOLUTION INTRAVENOUS at 09:35

## 2019-09-17 RX ADMIN — OCTREOTIDE ACETATE: 500 INJECTION, SOLUTION INTRAVENOUS; SUBCUTANEOUS at 09:35

## 2019-09-17 RX ADMIN — OCTREOTIDE ACETATE 50 MCG: 50 INJECTION, SOLUTION INTRAVENOUS; SUBCUTANEOUS at 22:34

## 2019-09-17 RX ADMIN — Medication 10 ML: at 21:22

## 2019-09-17 RX ADMIN — OCTREOTIDE ACETATE 50 MCG: 50 INJECTION, SOLUTION INTRAVENOUS; SUBCUTANEOUS at 04:19

## 2019-09-17 RX ADMIN — LORAZEPAM 1 MG: 1 TABLET ORAL at 07:09

## 2019-09-17 RX ADMIN — FLUOXETINE 20 MG: 20 CAPSULE ORAL at 21:23

## 2019-09-17 ASSESSMENT — PAIN SCALES - GENERAL
PAINLEVEL_OUTOF10: 0
PAINLEVEL_OUTOF10: 0
PAINLEVEL_OUTOF10: 5

## 2019-09-17 ASSESSMENT — PAIN - FUNCTIONAL ASSESSMENT: PAIN_FUNCTIONAL_ASSESSMENT: ACTIVITIES ARE NOT PREVENTED

## 2019-09-17 ASSESSMENT — PAIN DESCRIPTION - ONSET: ONSET: GRADUAL

## 2019-09-17 ASSESSMENT — PAIN DESCRIPTION - LOCATION: LOCATION: ABDOMEN

## 2019-09-17 ASSESSMENT — PAIN DESCRIPTION - ORIENTATION: ORIENTATION: RIGHT

## 2019-09-17 ASSESSMENT — PAIN DESCRIPTION - DESCRIPTORS: DESCRIPTORS: ACHING;DISCOMFORT;DULL

## 2019-09-17 ASSESSMENT — PAIN DESCRIPTION - PAIN TYPE: TYPE: ACUTE PAIN

## 2019-09-17 ASSESSMENT — PAIN DESCRIPTION - PROGRESSION: CLINICAL_PROGRESSION: GRADUALLY WORSENING

## 2019-09-17 ASSESSMENT — PAIN DESCRIPTION - FREQUENCY: FREQUENCY: INTERMITTENT

## 2019-09-18 LAB
5 HIAA URINE (PER GM CREAT): 126 MG/GCR (ref 0–14)
5-HIAA 24 HOUR URINE: ABNORMAL MG/D (ref 0–15)
5-HIAA INTERPRETATION: ABNORMAL
5-HIAA URINE: 51.6 MG/L
CREATININE 24 HOUR URINE: ABNORMAL MG/D (ref 700–1600)
CREATININE URINE: 41 MG/DL
EKG ATRIAL RATE: 64 BPM
EKG P AXIS: 36 DEGREES
EKG P-R INTERVAL: 148 MS
EKG Q-T INTERVAL: 392 MS
EKG QRS DURATION: 74 MS
EKG QTC CALCULATION (BAZETT): 404 MS
EKG R AXIS: 46 DEGREES
EKG T AXIS: 47 DEGREES
EKG VENTRICULAR RATE: 64 BPM
HOURS COLLECTED: ABNORMAL HR
URINE TOTAL VOLUME: ABNORMAL ML

## 2019-09-18 PROCEDURE — 2580000003 HC RX 258: Performed by: INTERNAL MEDICINE

## 2019-09-18 PROCEDURE — 6370000000 HC RX 637 (ALT 250 FOR IP): Performed by: INTERNAL MEDICINE

## 2019-09-18 PROCEDURE — 1200000000 HC SEMI PRIVATE

## 2019-09-18 PROCEDURE — 6360000002 HC RX W HCPCS: Performed by: INTERNAL MEDICINE

## 2019-09-18 RX ADMIN — Medication 10 ML: at 21:02

## 2019-09-18 RX ADMIN — OCTREOTIDE ACETATE 50 MCG: 50 INJECTION, SOLUTION INTRAVENOUS; SUBCUTANEOUS at 06:07

## 2019-09-18 RX ADMIN — OCTREOTIDE ACETATE 50 MCG: 50 INJECTION, SOLUTION INTRAVENOUS; SUBCUTANEOUS at 21:56

## 2019-09-18 RX ADMIN — OCTREOTIDE ACETATE 50 MCG: 50 INJECTION, SOLUTION INTRAVENOUS; SUBCUTANEOUS at 14:42

## 2019-09-18 RX ADMIN — HYDROCODONE BITARTRATE AND ACETAMINOPHEN 1 TABLET: 5; 325 TABLET ORAL at 15:06

## 2019-09-18 RX ADMIN — FLUOXETINE 20 MG: 20 CAPSULE ORAL at 21:01

## 2019-09-18 ASSESSMENT — PAIN SCALES - GENERAL
PAINLEVEL_OUTOF10: 0
PAINLEVEL_OUTOF10: 6

## 2019-09-18 NOTE — PLAN OF CARE
Problem: Anxiety:  Goal: Level of anxiety will decrease  Description  Level of anxiety will decrease  Outcome: Ongoing

## 2019-09-19 PROCEDURE — 2580000003 HC RX 258: Performed by: INTERNAL MEDICINE

## 2019-09-19 PROCEDURE — 1200000000 HC SEMI PRIVATE

## 2019-09-19 PROCEDURE — 6360000002 HC RX W HCPCS: Performed by: INTERNAL MEDICINE

## 2019-09-19 PROCEDURE — 6370000000 HC RX 637 (ALT 250 FOR IP): Performed by: INTERNAL MEDICINE

## 2019-09-19 RX ADMIN — OCTREOTIDE ACETATE 50 MCG: 50 INJECTION, SOLUTION INTRAVENOUS; SUBCUTANEOUS at 05:45

## 2019-09-19 RX ADMIN — HYDROCODONE BITARTRATE AND ACETAMINOPHEN 1 TABLET: 5; 325 TABLET ORAL at 22:01

## 2019-09-19 RX ADMIN — OCTREOTIDE ACETATE 50 MCG: 50 INJECTION, SOLUTION INTRAVENOUS; SUBCUTANEOUS at 22:01

## 2019-09-19 RX ADMIN — FLUOXETINE 20 MG: 20 CAPSULE ORAL at 22:01

## 2019-09-19 RX ADMIN — OCTREOTIDE ACETATE 50 MCG: 50 INJECTION, SOLUTION INTRAVENOUS; SUBCUTANEOUS at 14:11

## 2019-09-19 RX ADMIN — HYDROCODONE BITARTRATE AND ACETAMINOPHEN 1 TABLET: 5; 325 TABLET ORAL at 11:53

## 2019-09-19 RX ADMIN — Medication 10 ML: at 08:16

## 2019-09-19 ASSESSMENT — PAIN DESCRIPTION - LOCATION: LOCATION: ABDOMEN

## 2019-09-19 ASSESSMENT — PAIN DESCRIPTION - ORIENTATION: ORIENTATION: RIGHT

## 2019-09-19 ASSESSMENT — PAIN - FUNCTIONAL ASSESSMENT: PAIN_FUNCTIONAL_ASSESSMENT: ACTIVITIES ARE NOT PREVENTED

## 2019-09-19 ASSESSMENT — PAIN DESCRIPTION - ONSET: ONSET: SUDDEN

## 2019-09-19 ASSESSMENT — PAIN DESCRIPTION - DESCRIPTORS: DESCRIPTORS: SHARP;CRAMPING;ACHING

## 2019-09-19 ASSESSMENT — PAIN DESCRIPTION - PAIN TYPE: TYPE: ACUTE PAIN

## 2019-09-19 ASSESSMENT — PAIN SCALES - GENERAL
PAINLEVEL_OUTOF10: 7
PAINLEVEL_OUTOF10: 5
PAINLEVEL_OUTOF10: 0
PAINLEVEL_OUTOF10: 1

## 2019-09-19 ASSESSMENT — PAIN DESCRIPTION - FREQUENCY: FREQUENCY: INTERMITTENT

## 2019-09-19 ASSESSMENT — PAIN DESCRIPTION - PROGRESSION: CLINICAL_PROGRESSION: GRADUALLY WORSENING

## 2019-09-19 NOTE — PLAN OF CARE
Problem: Falls - Risk of:  Goal: Will remain free from falls  Description  Will remain free from falls  9/18/2019 2244 by Xavier Modi RN  Outcome: Ongoing  9/18/2019 0953 by Arden Adame RN  Outcome: Met This Shift

## 2019-09-19 NOTE — CARE COORDINATION
Social work:    Social service received call back from Desmond Watson at [facility] office once more. Desmond Watson advised that there was a change in the Sandostatin order. She states the order is now for a 200 microgram vile which will provide Katheryn Hammers with a three month supply and a financial savings. Desmond Watson is confirming delivery for tomorrow with [employer]. Social work updated Mana Lan who also inquired about financial assistance for hospital if eligible after insurance and possible Medicaid options for Katheryn Hammers eligible as well. Social work offered support and will provide information to Brain regarding his concerns.      Electronically signed by FRANNIE Mora on 9/19/2019 at 12:53 PM

## 2019-09-20 VITALS
SYSTOLIC BLOOD PRESSURE: 130 MMHG | WEIGHT: 192 LBS | OXYGEN SATURATION: 99 % | BODY MASS INDEX: 29.1 KG/M2 | HEIGHT: 68 IN | TEMPERATURE: 97.4 F | DIASTOLIC BLOOD PRESSURE: 74 MMHG | RESPIRATION RATE: 16 BRPM | HEART RATE: 59 BPM

## 2019-09-20 LAB
ADRENOCORTICOTROPIC HORMONE: 56.4 PG/ML (ref 7.2–63.3)
BASOPHILS ABSOLUTE: 0.06 E9/L (ref 0–0.2)
BASOPHILS RELATIVE PERCENT: 0.9 % (ref 0–2)
EOSINOPHILS ABSOLUTE: 0.43 E9/L (ref 0.05–0.5)
EOSINOPHILS RELATIVE PERCENT: 6.2 % (ref 0–6)
HCT VFR BLD CALC: 36.7 % (ref 34–48)
HEMOGLOBIN: 11.7 G/DL (ref 11.5–15.5)
IMMATURE GRANULOCYTES #: 0.02 E9/L
IMMATURE GRANULOCYTES %: 0.3 % (ref 0–5)
LYMPHOCYTES ABSOLUTE: 2.06 E9/L (ref 1.5–4)
LYMPHOCYTES RELATIVE PERCENT: 29.7 % (ref 20–42)
MCH RBC QN AUTO: 28.8 PG (ref 26–35)
MCHC RBC AUTO-ENTMCNC: 31.9 % (ref 32–34.5)
MCV RBC AUTO: 90.4 FL (ref 80–99.9)
MONOCYTES ABSOLUTE: 0.66 E9/L (ref 0.1–0.95)
MONOCYTES RELATIVE PERCENT: 9.5 % (ref 2–12)
NEUTROPHILS ABSOLUTE: 3.7 E9/L (ref 1.8–7.3)
NEUTROPHILS RELATIVE PERCENT: 53.4 % (ref 43–80)
PDW BLD-RTO: 13.8 FL (ref 11.5–15)
PLATELET # BLD: 231 E9/L (ref 130–450)
PMV BLD AUTO: 11.1 FL (ref 7–12)
RBC # BLD: 4.06 E12/L (ref 3.5–5.5)
WBC # BLD: 6.9 E9/L (ref 4.5–11.5)

## 2019-09-20 PROCEDURE — 6370000000 HC RX 637 (ALT 250 FOR IP): Performed by: INTERNAL MEDICINE

## 2019-09-20 PROCEDURE — 6360000002 HC RX W HCPCS: Performed by: INTERNAL MEDICINE

## 2019-09-20 PROCEDURE — 36415 COLL VENOUS BLD VENIPUNCTURE: CPT

## 2019-09-20 PROCEDURE — 85025 COMPLETE CBC W/AUTO DIFF WBC: CPT

## 2019-09-20 RX ADMIN — HYDROCODONE BITARTRATE AND ACETAMINOPHEN 1 TABLET: 5; 325 TABLET ORAL at 05:52

## 2019-09-20 RX ADMIN — OCTREOTIDE ACETATE 50 MCG: 50 INJECTION, SOLUTION INTRAVENOUS; SUBCUTANEOUS at 06:13

## 2019-09-20 ASSESSMENT — PAIN SCALES - GENERAL: PAINLEVEL_OUTOF10: 5

## 2019-09-20 NOTE — PATIENT CARE CONFERENCE
[facility] Quality Flow/Interdisciplinary Rounds Progress Note        Quality Flow Rounds held on September 20, 2019    Disciplines Attending:  Bedside Nurse, ,  and Nursing Unit [address] [facility] Panda Porter was admitted on 9/15/2019  3:26 PM    Anticipated Discharge Date:  Expected Discharge Date: 09/18/19    Disposition:    Denilson Score:  Denilson Scale Score: 22    Readmission Risk              Risk of Unplanned Readmission:        8           Discussed patient goal for the day, patient clinical progression, and barriers to discharge. The following Goal(s) of the Day/Commitment(s) have been identified:  Discharge home with Sandostatin when available. Comfort measures.       Stefania Alexis  September 20, 2019

## 2019-09-20 NOTE — PROGRESS NOTES
Await results of bx but if not carcinoid will be very surprised
[facility], MD, 1 mg at 09/17/19 0709    lidocaine PF 2 % injection 20 mL, 20 mL, Intradermal, Once, Ayala Alexander MD    HYDROcodone-acetaminophen [facility]) 5-325 MG per tablet 1 tablet, 1 tablet, Oral, Q4H PRN, Rylee Orozco MD, 1 tablet at 09/20/19 0552    octreotide (SANDOSTATIN) injection 50 mcg, 50 mcg, Subcutaneous, Q8H, Rylee Orozco MD, 50 mcg at 09/20/19 2347    FLUoxetine (PROZAC) capsule 20 mg, 20 mg, Oral, Daily, Rylee Orozco MD, 20 mg at 09/19/19 2201    acetaminophen (TYLENOL) tablet 500 mg, 500 mg, Oral, Q6H PRN, Rylee Orozco MD    sodium chloride flush 0.9 % injection 10 mL, 10 mL, Intravenous, BID, Rylee Orozco MD, 10 mL at 09/19/19 5380    Assessment:    Active Problems:    Metastatic carcinoid tumor [facility])  Resolved Problems:    * No resolved hospital problems. *    50 yo female with carcinoid syndrome likely due to underlying metastatic carcinoid tumor involving the liver based on CT from [facility]. Chromogranina A elevated at 16,000. She was admitted for symptom management and started on Octreotide. Underwent liver biopsy on 9/17/19. Urine 5HIAA: Elevated at 126 (0-14)      Plan: Will start Long acting Octreotide as an outpatient next week, continue short acting for now. Colonoscopy as outpatient. Reviewed CT IR images of liver, there is extensive bilobar metastasis, she would not be a candidate for liver directed therapy. Follow up with me in the office next week.      Electronically signed by Enmanuel Kennedy MD on 9/20/2019 at 8:49 AM

## 2019-09-22 LAB — SEROTONIN, SERUM: 1519 NG/ML (ref 50–220)

## 2019-09-23 ENCOUNTER — OFFICE VISIT (OUTPATIENT)
Dept: FAMILY MEDICINE CLINIC | Age: 44
End: 2019-09-23
Payer: COMMERCIAL

## 2019-09-23 ENCOUNTER — TELEPHONE (OUTPATIENT)
Dept: FAMILY MEDICINE CLINIC | Age: 44
End: 2019-09-23

## 2019-09-23 VITALS
OXYGEN SATURATION: 98 % | HEART RATE: 74 BPM | DIASTOLIC BLOOD PRESSURE: 70 MMHG | WEIGHT: 196 LBS | BODY MASS INDEX: 29.8 KG/M2 | SYSTOLIC BLOOD PRESSURE: 122 MMHG

## 2019-09-23 DIAGNOSIS — C7B.00 METASTATIC CARCINOID TUMOR (HCC): Primary | ICD-10-CM

## 2019-09-23 DIAGNOSIS — F41.9 ANXIETY: ICD-10-CM

## 2019-09-23 DIAGNOSIS — F33.9 RECURRENT MAJOR DEPRESSIVE DISORDER, REMISSION STATUS UNSPECIFIED (HCC): ICD-10-CM

## 2019-09-23 LAB — CHROMOGRANIN A: ABNORMAL NG/ML (ref 0–160)

## 2019-09-23 PROCEDURE — 99213 OFFICE O/P EST LOW 20 MIN: CPT | Performed by: INTERNAL MEDICINE

## 2019-09-23 RX ORDER — HYDROCODONE BITARTRATE AND ACETAMINOPHEN 5; 325 MG/1; MG/1
1 TABLET ORAL EVERY 6 HOURS PRN
COMMUNITY
End: 2019-11-04

## 2019-09-23 RX ORDER — OCTREOTIDE ACETATE 50 UG/ML
50 INJECTION, SOLUTION INTRAVENOUS; SUBCUTANEOUS 3 TIMES DAILY
COMMUNITY
Start: 2019-09-19 | End: 2019-11-04 | Stop reason: ALTCHOICE

## 2019-09-23 NOTE — PROGRESS NOTES
Alcohol use: Never     Frequency: Never    Drug use: Not on file    Sexual activity: Not on file   Lifestyle    Physical activity:     Days per week: Not on file     Minutes per session: Not on file    Stress: Not on file   Relationships    Social connections:     Talks on phone: Not on file     Gets together: Not on file     Attends Judaism service: Not on file     Active member of club or organization: Not on file     Attends meetings of clubs or organizations: Not on file     Relationship status: Not on file    Intimate partner violence:     Fear of current or ex partner: Not on file     Emotionally abused: Not on file     Physically abused: Not on file     Forced sexual activity: Not on file   Other Topics Concern    Not on file   Social History Narrative    Not on file       Vitals:    09/23/19 0836   BP: 122/70   Pulse: 74   SpO2: 98%   Weight: 196 lb (88.9 kg)       Physical Exam  Constitutional: She is oriented to person, place, and time. She appears well-developed and well-nourished. No distress. Neck: Normal range of motion. Neck supple. No JVD present. No thyromegaly present. Cardiovascular: Normal rate, regular rhythm, normal heart sounds and intact distal pulses. Exam reveals no gallop and no friction rub.   No murmur heard. Pulmonary/Chest: Effort normal and breath sounds normal. No respiratory distress. She has no wheezes. She has no rales. Abdominal: Soft. Bowel sounds are normal. She exhibits no distension. Mild tenderness over the right upper quadrant from liver biopsy last week. Musculoskeletal: Normal range of motion. Lymphadenopathy:     She has no cervical axillary or inguinal adenopathy. Neurological: She is alert and oriented to person, place, and time. Skin: Skin is warm and dry. No rash noted. No erythema. Psychiatric: She has a normal mood and affect. Her behavior is normal.   Assessment and Plan:  Sami Hernandez was seen today for cancer.     Diagnoses and all orders

## 2019-09-23 NOTE — DISCHARGE SUMMARY
joint, effusions, or rash. Her lab work revealed normal electrolytes and renal function. Alkaline  phosphatase 124. TSH 2.1. Her CBC on entry was 12.0 and at discharge  11.7. HOSPITAL COURSE:  She was admitted to the hospital and initiated on  Sandostatin. She was seen by Dr. Reese Wilson, the Department of Oncology. He wished a Sandostatin scan arranged, but she had already been started  on octreotide. Liver biopsy was performed without complication. Pathology pending at the time of discharge. Sandostatin was initiated  at a dose of 50 mcg subcutaneously three times a day. Diarrhea  diminished substantially. Additional lab work pending at the time of  discharge included serum serotonin level. A spot urine for 5-HIAA was  positive. She is in pain at the biopsy site at the day of discharge,  but her repeat hemoglobin was unchanged and her vital signs were normal.    Prior to discharge, Sandostatin had preauthorized and arranged to be  delivered to my office and they will  it up the following day. She will be given a prescription for Vicodin at that time, #12, 5/325. She will follow up with her primary care provider on Monday and Prozac  will continue to be weaned. She will follow up with Dr. Reese Wilson and  there is an anticipation that she will be switched to long-acting  Sandostatin. The question of future colonoscopy will be addressed. Marques Manning MD    D: 09/23/2019 7:24:31       T: 09/23/2019 7:28:27     [email or web address]  Job#: [id number]     Doc#: [id number]    CC:   Charlotte Patricio MD

## 2019-10-10 ENCOUNTER — APPOINTMENT (OUTPATIENT)
Dept: GENERAL RADIOLOGY | Age: 44
End: 2019-10-10
Payer: COMMERCIAL

## 2019-10-10 ENCOUNTER — APPOINTMENT (OUTPATIENT)
Dept: CT IMAGING | Age: 44
End: 2019-10-10
Payer: COMMERCIAL

## 2019-10-10 ENCOUNTER — HOSPITAL ENCOUNTER (EMERGENCY)
Age: 44
Discharge: HOME | End: 2019-10-10
Attending: EMERGENCY MEDICINE
Payer: COMMERCIAL

## 2019-10-10 VITALS
DIASTOLIC BLOOD PRESSURE: 58 MMHG | WEIGHT: 184 LBS | RESPIRATION RATE: 16 BRPM | HEIGHT: 67 IN | OXYGEN SATURATION: 98 % | HEART RATE: 99 BPM | TEMPERATURE: 98.5 F | SYSTOLIC BLOOD PRESSURE: 120 MMHG | BODY MASS INDEX: 28.88 KG/M2

## 2019-10-10 DIAGNOSIS — C78.7 LIVER METASTASES (HCC): Primary | ICD-10-CM

## 2019-10-10 DIAGNOSIS — E86.0 DEHYDRATION: ICD-10-CM

## 2019-10-10 DIAGNOSIS — G44.89 OTHER HEADACHE SYNDROME: ICD-10-CM

## 2019-10-10 LAB
ALBUMIN SERPL-MCNC: 3.2 G/DL (ref 3.5–5.2)
ALP BLD-CCNC: 236 U/L (ref 35–104)
ALT SERPL-CCNC: 18 U/L (ref 0–32)
ANION GAP SERPL CALCULATED.3IONS-SCNC: 14 MMOL/L (ref 7–16)
AST SERPL-CCNC: 64 U/L (ref 0–31)
BACTERIA: ABNORMAL /HPF
BASOPHILS ABSOLUTE: 0.06 E9/L (ref 0–0.2)
BASOPHILS RELATIVE PERCENT: 0.5 % (ref 0–2)
BILIRUB SERPL-MCNC: 0.6 MG/DL (ref 0–1.2)
BILIRUBIN URINE: NEGATIVE
BLOOD, URINE: ABNORMAL
BUN BLDV-MCNC: 15 MG/DL (ref 6–20)
CALCIUM SERPL-MCNC: 8.8 MG/DL (ref 8.6–10.2)
CHLORIDE BLD-SCNC: 92 MMOL/L (ref 98–107)
CLARITY: CLEAR
CO2: 22 MMOL/L (ref 22–29)
COLOR: YELLOW
CREAT SERPL-MCNC: 1 MG/DL (ref 0.5–1)
EKG ATRIAL RATE: 82 BPM
EKG P AXIS: 50 DEGREES
EKG P-R INTERVAL: 142 MS
EKG Q-T INTERVAL: 366 MS
EKG QRS DURATION: 74 MS
EKG QTC CALCULATION (BAZETT): 427 MS
EKG R AXIS: 54 DEGREES
EKG T AXIS: 58 DEGREES
EKG VENTRICULAR RATE: 82 BPM
EOSINOPHILS ABSOLUTE: 0.03 E9/L (ref 0.05–0.5)
EOSINOPHILS RELATIVE PERCENT: 0.2 % (ref 0–6)
GFR AFRICAN AMERICAN: >60
GFR NON-AFRICAN AMERICAN: 60 ML/MIN/1.73
GLUCOSE BLD-MCNC: 142 MG/DL (ref 74–99)
GLUCOSE URINE: NEGATIVE MG/DL
HCG(URINE) PREGNANCY TEST: NEGATIVE
HCT VFR BLD CALC: 36.7 % (ref 34–48)
HEMOGLOBIN: 11.9 G/DL (ref 11.5–15.5)
IMMATURE GRANULOCYTES #: 0.15 E9/L
IMMATURE GRANULOCYTES %: 1.1 % (ref 0–5)
KETONES, URINE: NEGATIVE MG/DL
LACTIC ACID: 1.9 MMOL/L (ref 0.5–2.2)
LEUKOCYTE ESTERASE, URINE: NEGATIVE
LIPASE: 7 U/L (ref 13–60)
LYMPHOCYTES ABSOLUTE: 1.38 E9/L (ref 1.5–4)
LYMPHOCYTES RELATIVE PERCENT: 10.5 % (ref 20–42)
MAGNESIUM: 2.2 MG/DL (ref 1.6–2.6)
MCH RBC QN AUTO: 27.9 PG (ref 26–35)
MCHC RBC AUTO-ENTMCNC: 32.4 % (ref 32–34.5)
MCV RBC AUTO: 85.9 FL (ref 80–99.9)
MONOCYTES ABSOLUTE: 2.29 E9/L (ref 0.1–0.95)
MONOCYTES RELATIVE PERCENT: 17.5 % (ref 2–12)
NEUTROPHILS ABSOLUTE: 9.19 E9/L (ref 1.8–7.3)
NEUTROPHILS RELATIVE PERCENT: 70.2 % (ref 43–80)
NITRITE, URINE: NEGATIVE
OVALOCYTES: ABNORMAL
PDW BLD-RTO: 13.8 FL (ref 11.5–15)
PH UA: 6 (ref 5–9)
PLATELET # BLD: 315 E9/L (ref 130–450)
PMV BLD AUTO: 11.9 FL (ref 7–12)
POIKILOCYTES: ABNORMAL
POTASSIUM SERPL-SCNC: 3.9 MMOL/L (ref 3.5–5)
PROTEIN UA: 100 MG/DL
RBC # BLD: 4.27 E12/L (ref 3.5–5.5)
RBC UA: ABNORMAL /HPF (ref 0–2)
SODIUM BLD-SCNC: 128 MMOL/L (ref 132–146)
SPECIFIC GRAVITY UA: 1.01 (ref 1–1.03)
TOTAL PROTEIN: 7.2 G/DL (ref 6.4–8.3)
TROPONIN: <0.01 NG/ML (ref 0–0.03)
UROBILINOGEN, URINE: 1 E.U./DL
WBC # BLD: 13.1 E9/L (ref 4.5–11.5)
WBC UA: ABNORMAL /HPF (ref 0–5)

## 2019-10-10 PROCEDURE — 84484 ASSAY OF TROPONIN QUANT: CPT

## 2019-10-10 PROCEDURE — 80053 COMPREHEN METABOLIC PANEL: CPT

## 2019-10-10 PROCEDURE — 2580000003 HC RX 258: Performed by: EMERGENCY MEDICINE

## 2019-10-10 PROCEDURE — 74177 CT ABD & PELVIS W/CONTRAST: CPT

## 2019-10-10 PROCEDURE — 93005 ELECTROCARDIOGRAM TRACING: CPT | Performed by: EMERGENCY MEDICINE

## 2019-10-10 PROCEDURE — 71045 X-RAY EXAM CHEST 1 VIEW: CPT

## 2019-10-10 PROCEDURE — 81025 URINE PREGNANCY TEST: CPT

## 2019-10-10 PROCEDURE — 85025 COMPLETE CBC W/AUTO DIFF WBC: CPT

## 2019-10-10 PROCEDURE — 70450 CT HEAD/BRAIN W/O DYE: CPT

## 2019-10-10 PROCEDURE — 96361 HYDRATE IV INFUSION ADD-ON: CPT

## 2019-10-10 PROCEDURE — 83735 ASSAY OF MAGNESIUM: CPT

## 2019-10-10 PROCEDURE — 83605 ASSAY OF LACTIC ACID: CPT

## 2019-10-10 PROCEDURE — 93010 ELECTROCARDIOGRAM REPORT: CPT | Performed by: INTERNAL MEDICINE

## 2019-10-10 PROCEDURE — 6360000002 HC RX W HCPCS: Performed by: EMERGENCY MEDICINE

## 2019-10-10 PROCEDURE — 87040 BLOOD CULTURE FOR BACTERIA: CPT

## 2019-10-10 PROCEDURE — 6360000004 HC RX CONTRAST MEDICATION: Performed by: RADIOLOGY

## 2019-10-10 PROCEDURE — 99284 EMERGENCY DEPT VISIT MOD MDM: CPT

## 2019-10-10 PROCEDURE — 83690 ASSAY OF LIPASE: CPT

## 2019-10-10 PROCEDURE — 96375 TX/PRO/DX INJ NEW DRUG ADDON: CPT

## 2019-10-10 PROCEDURE — 81001 URINALYSIS AUTO W/SCOPE: CPT

## 2019-10-10 PROCEDURE — 96374 THER/PROPH/DIAG INJ IV PUSH: CPT

## 2019-10-10 RX ORDER — FENTANYL CITRATE 50 UG/ML
50 INJECTION, SOLUTION INTRAMUSCULAR; INTRAVENOUS ONCE
Status: DISCONTINUED | OUTPATIENT
Start: 2019-10-10 | End: 2019-10-10 | Stop reason: HOSPADM

## 2019-10-10 RX ORDER — OXYCODONE HYDROCHLORIDE AND ACETAMINOPHEN 5; 325 MG/1; MG/1
1 TABLET ORAL EVERY 6 HOURS PRN
Qty: 10 TABLET | Refills: 0 | Status: SHIPPED | OUTPATIENT
Start: 2019-10-10 | End: 2019-10-13

## 2019-10-10 RX ORDER — 0.9 % SODIUM CHLORIDE 0.9 %
1000 INTRAVENOUS SOLUTION INTRAVENOUS ONCE
Status: COMPLETED | OUTPATIENT
Start: 2019-10-10 | End: 2019-10-10

## 2019-10-10 RX ORDER — DIPHENHYDRAMINE HYDROCHLORIDE 50 MG/ML
25 INJECTION INTRAMUSCULAR; INTRAVENOUS ONCE
Status: COMPLETED | OUTPATIENT
Start: 2019-10-10 | End: 2019-10-10

## 2019-10-10 RX ORDER — METOCLOPRAMIDE 10 MG/1
10 TABLET ORAL 3 TIMES DAILY PRN
Qty: 10 TABLET | Refills: 0 | Status: SHIPPED | OUTPATIENT
Start: 2019-10-10 | End: 2020-01-01 | Stop reason: ALTCHOICE

## 2019-10-10 RX ORDER — METOCLOPRAMIDE HYDROCHLORIDE 5 MG/ML
10 INJECTION INTRAMUSCULAR; INTRAVENOUS ONCE
Status: COMPLETED | OUTPATIENT
Start: 2019-10-10 | End: 2019-10-10

## 2019-10-10 RX ORDER — ONDANSETRON 2 MG/ML
8 INJECTION INTRAMUSCULAR; INTRAVENOUS ONCE
Status: DISCONTINUED | OUTPATIENT
Start: 2019-10-10 | End: 2019-10-10

## 2019-10-10 RX ADMIN — METOCLOPRAMIDE 10 MG: 5 INJECTION, SOLUTION INTRAMUSCULAR; INTRAVENOUS at 14:47

## 2019-10-10 RX ADMIN — SODIUM CHLORIDE 1000 ML: 9 INJECTION, SOLUTION INTRAVENOUS at 14:47

## 2019-10-10 RX ADMIN — IOPAMIDOL 110 ML: 755 INJECTION, SOLUTION INTRAVENOUS at 15:32

## 2019-10-10 RX ADMIN — SODIUM CHLORIDE 1000 ML: 9 INJECTION, SOLUTION INTRAVENOUS at 16:30

## 2019-10-10 RX ADMIN — DIPHENHYDRAMINE HYDROCHLORIDE 25 MG: 50 INJECTION, SOLUTION INTRAMUSCULAR; INTRAVENOUS at 14:47

## 2019-10-10 ASSESSMENT — PAIN SCALES - GENERAL: PAINLEVEL_OUTOF10: 8

## 2019-10-10 ASSESSMENT — ENCOUNTER SYMPTOMS
SHORTNESS OF BREATH: 0
NAUSEA: 1
COUGH: 0
BACK PAIN: 0
ABDOMINAL PAIN: 1

## 2019-10-10 ASSESSMENT — PAIN DESCRIPTION - ORIENTATION: ORIENTATION: POSTERIOR

## 2019-10-10 ASSESSMENT — PAIN DESCRIPTION - FREQUENCY: FREQUENCY: CONTINUOUS

## 2019-10-10 ASSESSMENT — PAIN DESCRIPTION - ONSET: ONSET: GRADUAL

## 2019-10-10 ASSESSMENT — PAIN DESCRIPTION - PROGRESSION: CLINICAL_PROGRESSION: GRADUALLY WORSENING

## 2019-10-10 ASSESSMENT — PAIN DESCRIPTION - DESCRIPTORS: DESCRIPTORS: HEADACHE

## 2019-10-10 ASSESSMENT — PAIN DESCRIPTION - LOCATION: LOCATION: HEAD

## 2019-10-10 ASSESSMENT — PAIN DESCRIPTION - PAIN TYPE: TYPE: ACUTE PAIN

## 2019-10-15 LAB
BLOOD CULTURE, ROUTINE: NORMAL
CULTURE, BLOOD 2: NORMAL

## 2019-10-30 LAB
ALBUMIN SERPL-MCNC: 3.1 G/DL (ref 3.6–5.1)
ALP BLD-CCNC: 294 U/L (ref 33–115)
ALT SERPL-CCNC: 16 U/L (ref 6–29)
ANION GAP SERPL CALCULATED.3IONS-SCNC: ABNORMAL MMOL/L
AST SERPL-CCNC: 27 U/L (ref 10–35)
BASOPHILS ABSOLUTE: 59 /ΜL (ref 0–200)
BASOPHILS RELATIVE PERCENT: 0.5 %
BILIRUB SERPL-MCNC: 0.6 MG/DL (ref 0.1–1.4)
BUN BLDV-MCNC: 11 MG/DL (ref 7–25)
CALCIUM SERPL-MCNC: 8.4 MG/DL (ref 8.6–10.2)
CHLORIDE BLD-SCNC: 98 MMOL/L (ref 98–110)
CO2: 27 MMOL/L (ref 20–32)
CREAT SERPL-MCNC: 0.85 MG/DL (ref 0.5–1.1)
EOSINOPHILS ABSOLUTE: 117 /ΜL (ref 15–500)
EOSINOPHILS RELATIVE PERCENT: 1 %
GFR CALCULATED: ABNORMAL
GLUCOSE BLD-MCNC: 94 MG/DL (ref 65–99)
HCT VFR BLD CALC: 33.6 % (ref 36–46)
HEMOGLOBIN: 10.5 G/DL (ref 12–16)
LYMPHOCYTES ABSOLUTE: 2246 /ΜL (ref 850–3900)
LYMPHOCYTES RELATIVE PERCENT: 19.2 %
MCH RBC QN AUTO: 26.7 PG (ref 27–33)
MCHC RBC AUTO-ENTMCNC: 31.3 G/DL (ref 32–36)
MCV RBC AUTO: 85.5 FL (ref 80–100)
MONOCYTES ABSOLUTE: 1392 /ΜL (ref 200–950)
MONOCYTES RELATIVE PERCENT: 11.9 %
NEUTROPHILS ABSOLUTE: 7886 /ΜL (ref 1500–7800)
NEUTROPHILS RELATIVE PERCENT: 67.4 %
PLATELET # BLD: 367 K/ΜL (ref 140–400)
PMV BLD AUTO: 10.7 FL (ref 7.5–12.5)
POTASSIUM SERPL-SCNC: 4.3 MMOL/L (ref 3.5–5.3)
RBC # BLD: 3.93 10^6/ΜL (ref 3.8–5.1)
SODIUM BLD-SCNC: 134 MMOL/L (ref 135–146)
TOTAL PROTEIN: 6.4 (ref 6.1–8.1)
WBC # BLD: 11.7 10^3/ML (ref 3.8–10.8)

## 2019-11-04 ENCOUNTER — OFFICE VISIT (OUTPATIENT)
Dept: FAMILY MEDICINE CLINIC | Age: 44
End: 2019-11-04
Payer: COMMERCIAL

## 2019-11-04 ENCOUNTER — TELEPHONE (OUTPATIENT)
Dept: FAMILY MEDICINE CLINIC | Age: 44
End: 2019-11-04

## 2019-11-04 VITALS
OXYGEN SATURATION: 97 % | SYSTOLIC BLOOD PRESSURE: 108 MMHG | WEIGHT: 192 LBS | HEART RATE: 76 BPM | BODY MASS INDEX: 30.07 KG/M2 | DIASTOLIC BLOOD PRESSURE: 70 MMHG

## 2019-11-04 DIAGNOSIS — F33.9 RECURRENT MAJOR DEPRESSIVE DISORDER, REMISSION STATUS UNSPECIFIED (HCC): ICD-10-CM

## 2019-11-04 DIAGNOSIS — C7B.00 METASTATIC CARCINOID TUMOR (HCC): ICD-10-CM

## 2019-11-04 DIAGNOSIS — L98.9 SKIN LESION: Primary | ICD-10-CM

## 2019-11-04 DIAGNOSIS — R01.1 SYSTOLIC MURMUR: ICD-10-CM

## 2019-11-04 PROCEDURE — 99214 OFFICE O/P EST MOD 30 MIN: CPT | Performed by: INTERNAL MEDICINE

## 2019-11-04 RX ORDER — OXYCODONE HYDROCHLORIDE 5 MG/1
5 TABLET ORAL EVERY 8 HOURS PRN
COMMUNITY
End: 2020-01-01 | Stop reason: ALTCHOICE

## 2019-11-04 RX ORDER — LANREOTIDE ACETATE 120 MG/.5ML
120 INJECTION SUBCUTANEOUS ONCE
COMMUNITY

## 2019-12-03 ENCOUNTER — HOSPITAL ENCOUNTER (OUTPATIENT)
Dept: NON INVASIVE DIAGNOSTICS | Age: 44
Discharge: HOME | End: 2019-12-03
Payer: COMMERCIAL

## 2019-12-03 ENCOUNTER — TELEPHONE (OUTPATIENT)
Dept: FAMILY MEDICINE CLINIC | Age: 44
End: 2019-12-03

## 2019-12-03 DIAGNOSIS — R01.1 SYSTOLIC MURMUR: ICD-10-CM

## 2019-12-03 LAB
LV EF: 55 %
LVEF MODALITY: NORMAL

## 2019-12-03 PROCEDURE — 93306 TTE W/DOPPLER COMPLETE: CPT

## 2020-01-01 ENCOUNTER — TELEPHONE (OUTPATIENT)
Dept: FAMILY MEDICINE CLINIC | Age: 45
End: 2020-01-01

## 2020-01-01 ENCOUNTER — HOSPITAL ENCOUNTER (OUTPATIENT)
Age: 45
Discharge: HOME | End: 2020-03-11
Payer: COMMERCIAL

## 2020-01-01 ENCOUNTER — HOSPITAL ENCOUNTER (EMERGENCY)
Age: 45
Discharge: HOME | End: 2020-01-14
Attending: EMERGENCY MEDICINE
Payer: COMMERCIAL

## 2020-01-01 ENCOUNTER — HOSPITAL ENCOUNTER (OUTPATIENT)
Age: 45
Discharge: HOME | End: 2020-03-20
Payer: COMMERCIAL

## 2020-01-01 ENCOUNTER — HOSPITAL ENCOUNTER (OUTPATIENT)
Age: 45
Discharge: HOME | End: 2020-01-18
Payer: COMMERCIAL

## 2020-01-01 ENCOUNTER — OFFICE VISIT (OUTPATIENT)
Dept: FAMILY MEDICINE CLINIC | Age: 45
End: 2020-01-01
Payer: COMMERCIAL

## 2020-01-01 ENCOUNTER — HOSPITAL ENCOUNTER (OUTPATIENT)
Age: 45
Discharge: HOME | End: 2020-01-25
Payer: COMMERCIAL

## 2020-01-01 ENCOUNTER — APPOINTMENT (OUTPATIENT)
Dept: GENERAL RADIOLOGY | Age: 45
DRG: 871 | End: 2020-01-01
Payer: COMMERCIAL

## 2020-01-01 ENCOUNTER — APPOINTMENT (OUTPATIENT)
Dept: CT IMAGING | Age: 45
DRG: 871 | End: 2020-01-01
Payer: COMMERCIAL

## 2020-01-01 ENCOUNTER — HOSPITAL ENCOUNTER (OUTPATIENT)
Age: 45
Discharge: HOME | End: 2020-01-22
Payer: COMMERCIAL

## 2020-01-01 ENCOUNTER — VIRTUAL VISIT (OUTPATIENT)
Dept: PRIMARY CARE CLINIC | Age: 45
End: 2020-01-01
Payer: COMMERCIAL

## 2020-01-01 ENCOUNTER — APPOINTMENT (OUTPATIENT)
Dept: GENERAL RADIOLOGY | Age: 45
End: 2020-01-01
Payer: COMMERCIAL

## 2020-01-01 ENCOUNTER — HOSPITAL ENCOUNTER (INPATIENT)
Age: 45
LOS: 1 days | DRG: 871 | End: 2020-12-17
Attending: EMERGENCY MEDICINE | Admitting: INTERNAL MEDICINE
Payer: COMMERCIAL

## 2020-01-01 ENCOUNTER — HOSPITAL ENCOUNTER (OUTPATIENT)
Age: 45
Discharge: HOME | End: 2020-03-06
Payer: COMMERCIAL

## 2020-01-01 VITALS
OXYGEN SATURATION: 81 % | SYSTOLIC BLOOD PRESSURE: 100 MMHG | HEIGHT: 67 IN | WEIGHT: 144.62 LBS | TEMPERATURE: 98.3 F | BODY MASS INDEX: 22.7 KG/M2 | DIASTOLIC BLOOD PRESSURE: 51 MMHG

## 2020-01-01 VITALS
TEMPERATURE: 98.9 F | HEIGHT: 67 IN | DIASTOLIC BLOOD PRESSURE: 82 MMHG | RESPIRATION RATE: 18 BRPM | HEART RATE: 88 BPM | SYSTOLIC BLOOD PRESSURE: 114 MMHG | BODY MASS INDEX: 25.11 KG/M2 | OXYGEN SATURATION: 97 % | WEIGHT: 160 LBS

## 2020-01-01 VITALS
BODY MASS INDEX: 24.23 KG/M2 | HEIGHT: 67 IN | TEMPERATURE: 97.4 F | SYSTOLIC BLOOD PRESSURE: 110 MMHG | DIASTOLIC BLOOD PRESSURE: 62 MMHG | WEIGHT: 154.4 LBS | OXYGEN SATURATION: 97 % | HEART RATE: 100 BPM

## 2020-01-01 VITALS
SYSTOLIC BLOOD PRESSURE: 100 MMHG | DIASTOLIC BLOOD PRESSURE: 68 MMHG | WEIGHT: 163 LBS | HEART RATE: 107 BPM | BODY MASS INDEX: 25.53 KG/M2 | OXYGEN SATURATION: 98 %

## 2020-01-01 VITALS — BODY MASS INDEX: 24.48 KG/M2 | HEIGHT: 67 IN | WEIGHT: 156 LBS

## 2020-01-01 LAB
AADO2: 254.7 MMHG
AADO2: 421.4 MMHG
AADO2: 569.3 MMHG
ABO/RH: NORMAL
ACANTHOCYTES: ABNORMAL
ACETAMINOPHEN LEVEL: 7.5 MCG/ML (ref 10–30)
ADENOVIRUS BY PCR: DETECTED
ALBUMIN SERPL-MCNC: 1.7 G/DL (ref 3.5–5.2)
ALBUMIN SERPL-MCNC: 1.8 G/DL (ref 3.5–5.2)
ALBUMIN SERPL-MCNC: 1.9 G/DL (ref 3.5–5.2)
ALBUMIN SERPL-MCNC: 2 G/DL (ref 3.5–5.2)
ALBUMIN SERPL-MCNC: 2.2 G/DL (ref 3.5–5.2)
ALP BLD-CCNC: 436 U/L (ref 35–104)
ALP BLD-CCNC: 601 U/L (ref 35–104)
ALP BLD-CCNC: 626 U/L (ref 35–104)
ALP BLD-CCNC: 750 U/L (ref 35–104)
ALP BLD-CCNC: 774 U/L (ref 35–104)
ALT SERPL-CCNC: 13 U/L (ref 0–32)
ALT SERPL-CCNC: 26 U/L (ref 0–32)
ALT SERPL-CCNC: 50 U/L (ref 0–32)
ALT SERPL-CCNC: 56 U/L (ref 0–32)
ALT SERPL-CCNC: 66 U/L (ref 0–32)
AMMONIA: 44 UMOL/L (ref 11–51)
AMMONIA: 74.3 UMOL/L (ref 11–51)
AMMONIA: 91.5 UMOL/L (ref 11–51)
ANION GAP SERPL CALCULATED.3IONS-SCNC: 13 MMOL/L (ref 7–16)
ANION GAP SERPL CALCULATED.3IONS-SCNC: 16 MMOL/L (ref 7–16)
ANION GAP SERPL CALCULATED.3IONS-SCNC: 17 MMOL/L (ref 7–16)
ANION GAP SERPL CALCULATED.3IONS-SCNC: 19 MMOL/L (ref 7–16)
ANION GAP SERPL CALCULATED.3IONS-SCNC: 20 MMOL/L (ref 7–16)
ANION GAP SERPL CALCULATED.3IONS-SCNC: 21 MMOL/L (ref 7–16)
ANISOCYTOSIS: ABNORMAL
ANTIBODY SCREEN: NORMAL
APPEARANCE FLUID: CLEAR
APTT: 29.1 SEC (ref 24.5–35.1)
APTT: 43.1 SEC (ref 24.5–35.1)
AST SERPL-CCNC: 1035 U/L (ref 0–31)
AST SERPL-CCNC: 29 U/L (ref 0–31)
AST SERPL-CCNC: 57 U/L (ref 0–31)
AST SERPL-CCNC: 722 U/L (ref 0–31)
AST SERPL-CCNC: 93 U/L (ref 0–31)
ATYPICAL LYMPHOCYTE RELATIVE PERCENT: 2 % (ref 0–4)
ATYPICAL LYMPHOCYTE RELATIVE PERCENT: 5.3 % (ref 0–4)
B.E.: -10.3 MMOL/L (ref -3–3)
B.E.: -11.2 MMOL/L (ref -3–3)
B.E.: -13.6 MMOL/L (ref -3–3)
B.E.: -17 MMOL/L (ref -3–3)
BACTERIA: ABNORMAL /HPF
BASOPHILS ABSOLUTE: 0 E9/L (ref 0–0.2)
BASOPHILS ABSOLUTE: 0.03 E9/L (ref 0–0.2)
BASOPHILS ABSOLUTE: 0.08 E9/L (ref 0–0.2)
BASOPHILS ABSOLUTE: 0.12 E9/L (ref 0–0.2)
BASOPHILS RELATIVE PERCENT: 0 % (ref 0–2)
BASOPHILS RELATIVE PERCENT: 0.2 % (ref 0–2)
BASOPHILS RELATIVE PERCENT: 0.3 % (ref 0–2)
BASOPHILS RELATIVE PERCENT: 0.4 % (ref 0–2)
BASOPHILS RELATIVE PERCENT: 0.6 % (ref 0–2)
BASOPHILS RELATIVE PERCENT: 0.8 % (ref 0–2)
BASOPHILS RELATIVE PERCENT: 0.8 % (ref 0–2)
BETA-HYDROXYBUTYRATE: 0.38 MMOL/L (ref 0.02–0.27)
BILIRUB SERPL-MCNC: 0.6 MG/DL (ref 0–1.2)
BILIRUB SERPL-MCNC: 1.1 MG/DL (ref 0–1.2)
BILIRUB SERPL-MCNC: 1.4 MG/DL (ref 0–1.2)
BILIRUB SERPL-MCNC: 1.6 MG/DL (ref 0–1.2)
BILIRUB SERPL-MCNC: 1.7 MG/DL (ref 0–1.2)
BILIRUBIN DIRECT: 1.1 MG/DL (ref 0–0.3)
BILIRUBIN URINE: ABNORMAL
BILIRUBIN, INDIRECT: 0.3 MG/DL (ref 0–1)
BLASTS RELATIVE PERCENT: 2.6 % (ref 0–0)
BLOOD CULTURE, ROUTINE: NORMAL
BLOOD, URINE: NEGATIVE
BORDETELLA PARAPERTUSSIS BY PCR: NOT DETECTED
BORDETELLA PERTUSSIS BY PCR: NOT DETECTED
BUN BLDV-MCNC: 10 MG/DL (ref 6–20)
BUN BLDV-MCNC: 15 MG/DL (ref 6–20)
BUN BLDV-MCNC: 46 MG/DL (ref 6–20)
BUN BLDV-MCNC: 47 MG/DL (ref 6–20)
BUN BLDV-MCNC: 48 MG/DL (ref 6–20)
BUN BLDV-MCNC: 7 MG/DL (ref 6–20)
BUN BLDV-MCNC: 8 MG/DL (ref 6–20)
BURR CELLS: ABNORMAL
C-REACTIVE PROTEIN: 13 MG/DL (ref 0–0.4)
CALCIUM SERPL-MCNC: 7.5 MG/DL (ref 8.6–10.2)
CALCIUM SERPL-MCNC: 7.9 MG/DL (ref 8.6–10.2)
CALCIUM SERPL-MCNC: 8 MG/DL (ref 8.6–10.2)
CALCIUM SERPL-MCNC: 8.1 MG/DL (ref 8.6–10.2)
CALCIUM SERPL-MCNC: 8.2 MG/DL (ref 8.6–10.2)
CALCIUM SERPL-MCNC: 8.4 MG/DL (ref 8.6–10.2)
CALCIUM SERPL-MCNC: 8.5 MG/DL (ref 8.6–10.2)
CALCIUM SERPL-MCNC: 8.7 MG/DL (ref 8.6–10.2)
CALCIUM SERPL-MCNC: 9 MG/DL (ref 8.6–10.2)
CELL COUNT FLUID TYPE: NORMAL
CHLAMYDOPHILIA PNEUMONIAE BY PCR: NOT DETECTED
CHLORIDE BLD-SCNC: 100 MMOL/L (ref 98–107)
CHLORIDE BLD-SCNC: 102 MMOL/L (ref 98–107)
CHLORIDE BLD-SCNC: 87 MMOL/L (ref 98–107)
CHLORIDE BLD-SCNC: 91 MMOL/L (ref 98–107)
CHLORIDE BLD-SCNC: 97 MMOL/L (ref 98–107)
CHLORIDE BLD-SCNC: 98 MMOL/L (ref 98–107)
CHLORIDE BLD-SCNC: 98 MMOL/L (ref 98–107)
CHLORIDE BLD-SCNC: 99 MMOL/L (ref 98–107)
CHLORIDE BLD-SCNC: 99 MMOL/L (ref 98–107)
CLARITY: CLEAR
CO2: 11 MMOL/L (ref 22–29)
CO2: 13 MMOL/L (ref 22–29)
CO2: 13 MMOL/L (ref 22–29)
CO2: 17 MMOL/L (ref 22–29)
CO2: 17 MMOL/L (ref 22–29)
CO2: 20 MMOL/L (ref 22–29)
CO2: 24 MMOL/L (ref 22–29)
CO2: 25 MMOL/L (ref 22–29)
CO2: 26 MMOL/L (ref 22–29)
COHB: 0.8 % (ref 0–1.5)
COHB: 0.8 % (ref 0–1.5)
COHB: 0.9 % (ref 0–1.5)
COHB: 1.2 % (ref 0–1.5)
COLOR FLUID: YELLOW
COLOR: YELLOW
CORONAVIRUS 229E BY PCR: NOT DETECTED
CORONAVIRUS HKU1 BY PCR: NOT DETECTED
CORONAVIRUS NL63 BY PCR: NOT DETECTED
CORONAVIRUS OC43 BY PCR: NOT DETECTED
CREAT SERPL-MCNC: 0.6 MG/DL (ref 0.5–1)
CREAT SERPL-MCNC: 0.7 MG/DL (ref 0.5–1)
CREAT SERPL-MCNC: 0.8 MG/DL (ref 0.5–1)
CREAT SERPL-MCNC: 0.9 MG/DL (ref 0.5–1)
CREAT SERPL-MCNC: 2.4 MG/DL (ref 0.5–1)
CREAT SERPL-MCNC: 2.5 MG/DL (ref 0.5–1)
CREAT SERPL-MCNC: 2.6 MG/DL (ref 0.5–1)
CREAT SERPL-MCNC: 2.8 MG/DL (ref 0.5–1)
CREAT SERPL-MCNC: 2.9 MG/DL (ref 0.5–1)
CREATININE URINE: 55 MG/DL (ref 29–226)
CRITICAL: ABNORMAL
CULTURE, BLOOD 2: NORMAL
D DIMER: 2235 NG/ML DDU
DATE ANALYZED: ABNORMAL
DATE OF COLLECTION: ABNORMAL
EKG ATRIAL RATE: 86 BPM
EKG ATRIAL RATE: 96 BPM
EKG P AXIS: 57 DEGREES
EKG P AXIS: 60 DEGREES
EKG P-R INTERVAL: 146 MS
EKG P-R INTERVAL: 166 MS
EKG Q-T INTERVAL: 322 MS
EKG Q-T INTERVAL: 390 MS
EKG QRS DURATION: 68 MS
EKG QRS DURATION: 76 MS
EKG QTC CALCULATION (BAZETT): 406 MS
EKG QTC CALCULATION (BAZETT): 466 MS
EKG R AXIS: 33 DEGREES
EKG R AXIS: 51 DEGREES
EKG T AXIS: 61 DEGREES
EKG T AXIS: 91 DEGREES
EKG VENTRICULAR RATE: 86 BPM
EKG VENTRICULAR RATE: 96 BPM
EOSINOPHILS ABSOLUTE: 0 E9/L (ref 0.05–0.5)
EOSINOPHILS ABSOLUTE: 0.02 E9/L (ref 0.05–0.5)
EOSINOPHILS ABSOLUTE: 0.02 E9/L (ref 0.05–0.5)
EOSINOPHILS ABSOLUTE: 0.19 E9/L (ref 0.05–0.5)
EOSINOPHILS ABSOLUTE: 0.57 E9/L (ref 0.05–0.5)
EOSINOPHILS ABSOLUTE: 0.62 E9/L (ref 0.05–0.5)
EOSINOPHILS RELATIVE PERCENT: 0 % (ref 0–6)
EOSINOPHILS RELATIVE PERCENT: 0.1 % (ref 0–6)
EOSINOPHILS RELATIVE PERCENT: 1 % (ref 0–6)
EOSINOPHILS RELATIVE PERCENT: 1.3 % (ref 0–6)
EOSINOPHILS RELATIVE PERCENT: 2.6 % (ref 0–6)
EOSINOPHILS RELATIVE PERCENT: 2.7 % (ref 0–6)
ETHANOL: <10 MG/DL (ref 0–0.08)
FIO2: 100 %
FIO2: 100 %
FIO2: 60 %
FLUID TYPE: NORMAL
GFR AFRICAN AMERICAN: 21
GFR AFRICAN AMERICAN: 22
GFR AFRICAN AMERICAN: 24
GFR AFRICAN AMERICAN: 25
GFR AFRICAN AMERICAN: 26
GFR AFRICAN AMERICAN: >60
GFR NON-AFRICAN AMERICAN: 17 ML/MIN/1.73
GFR NON-AFRICAN AMERICAN: 18 ML/MIN/1.73
GFR NON-AFRICAN AMERICAN: 20 ML/MIN/1.73
GFR NON-AFRICAN AMERICAN: 21 ML/MIN/1.73
GFR NON-AFRICAN AMERICAN: 22 ML/MIN/1.73
GFR NON-AFRICAN AMERICAN: >60 ML/MIN/1.73
GLUCOSE BLD-MCNC: 104 MG/DL (ref 74–99)
GLUCOSE BLD-MCNC: 104 MG/DL (ref 74–99)
GLUCOSE BLD-MCNC: 123 MG/DL (ref 74–99)
GLUCOSE BLD-MCNC: 131 MG/DL (ref 74–99)
GLUCOSE BLD-MCNC: 172 MG/DL (ref 74–99)
GLUCOSE BLD-MCNC: 177 MG/DL (ref 74–99)
GLUCOSE BLD-MCNC: 205 MG/DL (ref 74–99)
GLUCOSE BLD-MCNC: 225 MG/DL (ref 74–99)
GLUCOSE BLD-MCNC: 87 MG/DL (ref 74–99)
GLUCOSE URINE: NEGATIVE MG/DL
GLUCOSE, FLUID: 68 MG/DL
GRAM STAIN ORDERABLE: NORMAL
HBA1C MFR BLD: 5.5 % (ref 4–5.6)
HCG, URINE, POC: NEGATIVE
HCO3: 11.5 MMOL/L (ref 22–26)
HCO3: 12.5 MMOL/L (ref 22–26)
HCO3: 14.3 MMOL/L (ref 22–26)
HCO3: 14.6 MMOL/L (ref 22–26)
HCT VFR BLD CALC: 33.4 % (ref 34–48)
HCT VFR BLD CALC: 37 % (ref 34–48)
HCT VFR BLD CALC: 37.4 % (ref 34–48)
HCT VFR BLD CALC: 38 % (ref 34–48)
HCT VFR BLD CALC: 39.3 % (ref 34–48)
HCT VFR BLD CALC: 39.7 % (ref 34–48)
HCT VFR BLD CALC: 40.2 % (ref 34–48)
HCT VFR BLD CALC: 40.4 % (ref 34–48)
HCT VFR BLD CALC: 41.2 % (ref 34–48)
HEMOGLOBIN: 10.8 G/DL (ref 11.5–15.5)
HEMOGLOBIN: 11 G/DL (ref 11.5–15.5)
HEMOGLOBIN: 11.2 G/DL (ref 11.5–15.5)
HEMOGLOBIN: 11.9 G/DL (ref 11.5–15.5)
HEMOGLOBIN: 12.4 G/DL (ref 11.5–15.5)
HEMOGLOBIN: 12.5 G/DL (ref 11.5–15.5)
HEMOGLOBIN: 13 G/DL (ref 11.5–15.5)
HEMOGLOBIN: 13.2 G/DL (ref 11.5–15.5)
HEMOGLOBIN: 13.9 G/DL (ref 11.5–15.5)
HHB: 0.3 % (ref 0–5)
HHB: 1.4 % (ref 0–5)
HHB: 25.4 % (ref 0–5)
HHB: 9.2 % (ref 0–5)
HUMAN METAPNEUMOVIRUS BY PCR: NOT DETECTED
HUMAN RHINOVIRUS/ENTEROVIRUS BY PCR: NOT DETECTED
HYALINE CASTS: ABNORMAL /LPF (ref 0–2)
HYPOCHROMIA: ABNORMAL
HYPOCHROMIA: ABNORMAL
IMMATURE GRANULOCYTES #: 0.1 E9/L
IMMATURE GRANULOCYTES #: 0.23 E9/L
IMMATURE GRANULOCYTES #: 0.42 E9/L
IMMATURE GRANULOCYTES %: 0.7 % (ref 0–5)
IMMATURE GRANULOCYTES %: 1.3 % (ref 0–5)
IMMATURE GRANULOCYTES %: 2 % (ref 0–5)
INFLUENZA A BY PCR: NOT DETECTED
INFLUENZA A BY PCR: NOT DETECTED
INFLUENZA B BY PCR: NOT DETECTED
INFLUENZA B BY PCR: NOT DETECTED
INR BLD: 1.3
INR BLD: 2.4
INR BLD: 3.1
KETONES, URINE: NEGATIVE MG/DL
LAB: ABNORMAL
LACTATE DEHYDROGENASE: 431 U/L (ref 135–214)
LACTATE DEHYDROGENASE: 583 U/L (ref 135–214)
LACTATE DEHYDROGENASE: 612 U/L (ref 135–214)
LACTIC ACID: 1.8 MMOL/L (ref 0.5–2.2)
LACTIC ACID: 10.1 MMOL/L (ref 0.5–2.2)
LACTIC ACID: 5.3 MMOL/L (ref 0.5–2.2)
LACTIC ACID: 6.5 MMOL/L (ref 0.5–2.2)
LACTIC ACID: 7 MMOL/L (ref 0.5–2.2)
LACTIC ACID: 9.7 MMOL/L (ref 0.5–2.2)
LD, FLUID: 152 U/L
LEUKOCYTE ESTERASE, URINE: NEGATIVE
LIPASE: 6 U/L (ref 13–60)
LYMPHOCYTES ABSOLUTE: 0.84 E9/L (ref 1.5–4)
LYMPHOCYTES ABSOLUTE: 1.72 E9/L (ref 1.5–4)
LYMPHOCYTES ABSOLUTE: 1.97 E9/L (ref 1.5–4)
LYMPHOCYTES ABSOLUTE: 2.01 E9/L (ref 1.5–4)
LYMPHOCYTES ABSOLUTE: 2.14 E9/L (ref 1.5–4)
LYMPHOCYTES ABSOLUTE: 2.79 E9/L (ref 1.5–4)
LYMPHOCYTES ABSOLUTE: 2.88 E9/L (ref 1.5–4)
LYMPHOCYTES ABSOLUTE: 3.2 E9/L (ref 1.5–4)
LYMPHOCYTES ABSOLUTE: 4.47 E9/L (ref 1.5–4)
LYMPHOCYTES RELATIVE PERCENT: 11.1 % (ref 20–42)
LYMPHOCYTES RELATIVE PERCENT: 13 % (ref 20–42)
LYMPHOCYTES RELATIVE PERCENT: 13.3 % (ref 20–42)
LYMPHOCYTES RELATIVE PERCENT: 14.2 % (ref 20–42)
LYMPHOCYTES RELATIVE PERCENT: 37.7 % (ref 20–42)
LYMPHOCYTES RELATIVE PERCENT: 43.4 % (ref 20–42)
LYMPHOCYTES RELATIVE PERCENT: 8.7 % (ref 20–42)
LYMPHOCYTES RELATIVE PERCENT: 80 % (ref 20–42)
LYMPHOCYTES RELATIVE PERCENT: 93 % (ref 20–42)
Lab: ABNORMAL
Lab: NORMAL
MAGNESIUM: 2.2 MG/DL (ref 1.6–2.6)
MAGNESIUM: 2.5 MG/DL (ref 1.6–2.6)
MCH RBC QN AUTO: 26.5 PG (ref 26–35)
MCH RBC QN AUTO: 26.5 PG (ref 26–35)
MCH RBC QN AUTO: 26.7 PG (ref 26–35)
MCH RBC QN AUTO: 26.7 PG (ref 26–35)
MCH RBC QN AUTO: 27.6 PG (ref 26–35)
MCH RBC QN AUTO: 28 PG (ref 26–35)
MCH RBC QN AUTO: 28.5 PG (ref 26–35)
MCH RBC QN AUTO: 29.3 PG (ref 26–35)
MCH RBC QN AUTO: 29.6 PG (ref 26–35)
MCHC RBC AUTO-ENTMCNC: 29.4 % (ref 32–34.5)
MCHC RBC AUTO-ENTMCNC: 30.3 % (ref 32–34.5)
MCHC RBC AUTO-ENTMCNC: 30.8 % (ref 32–34.5)
MCHC RBC AUTO-ENTMCNC: 30.9 % (ref 32–34.5)
MCHC RBC AUTO-ENTMCNC: 31.3 % (ref 32–34.5)
MCHC RBC AUTO-ENTMCNC: 31.6 % (ref 32–34.5)
MCHC RBC AUTO-ENTMCNC: 32.3 % (ref 32–34.5)
MCHC RBC AUTO-ENTMCNC: 33.6 % (ref 32–34.5)
MCHC RBC AUTO-ENTMCNC: 35 % (ref 32–34.5)
MCV RBC AUTO: 82.7 FL (ref 80–99.9)
MCV RBC AUTO: 84.5 FL (ref 80–99.9)
MCV RBC AUTO: 85.4 FL (ref 80–99.9)
MCV RBC AUTO: 85.8 FL (ref 80–99.9)
MCV RBC AUTO: 87.1 FL (ref 80–99.9)
MCV RBC AUTO: 87.5 FL (ref 80–99.9)
MCV RBC AUTO: 88.6 FL (ref 80–99.9)
MCV RBC AUTO: 89.5 FL (ref 80–99.9)
MCV RBC AUTO: 96.9 FL (ref 80–99.9)
METAMYELOCYTES RELATIVE PERCENT: 0.9 % (ref 0–1)
METAMYELOCYTES RELATIVE PERCENT: 2.6 % (ref 0–1)
METER GLUCOSE: 142 MG/DL (ref 74–99)
METER GLUCOSE: 148 MG/DL (ref 74–99)
METER GLUCOSE: 180 MG/DL (ref 74–99)
METER GLUCOSE: 219 MG/DL (ref 74–99)
METER GLUCOSE: 69 MG/DL (ref 74–99)
METER GLUCOSE: 83 MG/DL (ref 74–99)
METER GLUCOSE: <40 MG/DL (ref 74–99)
METHB: 0 % (ref 0–1.5)
METHB: 0.1 % (ref 0–1.5)
METHB: 0.3 % (ref 0–1.5)
METHB: 0.4 % (ref 0–1.5)
MODE: AC
MONO TEST: NEGATIVE
MONOCYTE, FLUID: 86 %
MONOCYTES ABSOLUTE: 0.04 E9/L (ref 0.1–0.95)
MONOCYTES ABSOLUTE: 0.25 E9/L (ref 0.1–0.95)
MONOCYTES ABSOLUTE: 0.51 E9/L (ref 0.1–0.95)
MONOCYTES ABSOLUTE: 0.91 E9/L (ref 0.1–0.95)
MONOCYTES ABSOLUTE: 0.94 E9/L (ref 0.1–0.95)
MONOCYTES ABSOLUTE: 1.14 E9/L (ref 0.1–0.95)
MONOCYTES ABSOLUTE: 1.23 E9/L (ref 0.1–0.95)
MONOCYTES ABSOLUTE: 1.67 E9/L (ref 0.1–0.95)
MONOCYTES ABSOLUTE: 2.09 E9/L (ref 0.1–0.95)
MONOCYTES RELATIVE PERCENT: 10 % (ref 2–12)
MONOCYTES RELATIVE PERCENT: 12 % (ref 2–12)
MONOCYTES RELATIVE PERCENT: 16.8 % (ref 2–12)
MONOCYTES RELATIVE PERCENT: 2.9 % (ref 2–12)
MONOCYTES RELATIVE PERCENT: 4.4 % (ref 2–12)
MONOCYTES RELATIVE PERCENT: 5 % (ref 2–12)
MONOCYTES RELATIVE PERCENT: 6.4 % (ref 2–12)
MONOCYTES RELATIVE PERCENT: 7 % (ref 2–12)
MONOCYTES RELATIVE PERCENT: 8.8 % (ref 2–12)
MYCOPLASMA PNEUMONIAE BY PCR: NOT DETECTED
MYELOCYTE PERCENT: 0.9 % (ref 0–0)
MYELOCYTE PERCENT: 3.5 % (ref 0–0)
MYELOCYTE PERCENT: 5.3 % (ref 0–0)
NEGATIVE QC PASS/FAIL: NORMAL
NEUTROPHIL, FLUID: 14 %
NEUTROPHILS ABSOLUTE: 0 E9/L (ref 1.8–7.3)
NEUTROPHILS ABSOLUTE: 0.11 E9/L (ref 1.8–7.3)
NEUTROPHILS ABSOLUTE: 10.81 E9/L (ref 1.8–7.3)
NEUTROPHILS ABSOLUTE: 14.97 E9/L (ref 1.8–7.3)
NEUTROPHILS ABSOLUTE: 15 E9/L (ref 1.8–7.3)
NEUTROPHILS ABSOLUTE: 19.52 E9/L (ref 1.8–7.3)
NEUTROPHILS ABSOLUTE: 2.68 E9/L (ref 1.8–7.3)
NEUTROPHILS ABSOLUTE: 20.17 E9/L (ref 1.8–7.3)
NEUTROPHILS ABSOLUTE: 4.99 E9/L (ref 1.8–7.3)
NEUTROPHILS RELATIVE PERCENT: 0 % (ref 43–80)
NEUTROPHILS RELATIVE PERCENT: 36.3 % (ref 43–80)
NEUTROPHILS RELATIVE PERCENT: 42.1 % (ref 43–80)
NEUTROPHILS RELATIVE PERCENT: 5 % (ref 43–80)
NEUTROPHILS RELATIVE PERCENT: 71.6 % (ref 43–80)
NEUTROPHILS RELATIVE PERCENT: 76.6 % (ref 43–80)
NEUTROPHILS RELATIVE PERCENT: 78.3 % (ref 43–80)
NEUTROPHILS RELATIVE PERCENT: 82 % (ref 43–80)
NEUTROPHILS RELATIVE PERCENT: 84.4 % (ref 43–80)
NITRITE, URINE: NEGATIVE
NUCLEATED CELLS FLUID: 94 /UL
NUCLEATED RED BLOOD CELLS: 0 /100 WBC
NUCLEATED RED BLOOD CELLS: 0.9 /100 WBC
NUCLEATED RED BLOOD CELLS: 1 /100 WBC
O2 CONTENT: 14.6 ML/DL
O2 CONTENT: 18.6 ML/DL
O2 CONTENT: 19.7 ML/DL
O2 CONTENT: 20.2 ML/DL
O2 SATURATION: 74.3 % (ref 92–98.5)
O2 SATURATION: 90.7 % (ref 92–98.5)
O2 SATURATION: 98.6 % (ref 92–98.5)
O2 SATURATION: 99.7 % (ref 92–98.5)
O2HB: 73.3 % (ref 94–97)
O2HB: 89.6 % (ref 94–97)
O2HB: 97.5 % (ref 94–97)
O2HB: 98.8 % (ref 94–97)
OPERATOR ID: 1687
OPERATOR ID: 316
OPERATOR ID: 46
OPERATOR ID: 913
OVALOCYTES: ABNORMAL
PARAINFLUENZA VIRUS 1 BY PCR: NOT DETECTED
PARAINFLUENZA VIRUS 2 BY PCR: NOT DETECTED
PARAINFLUENZA VIRUS 3 BY PCR: NOT DETECTED
PARAINFLUENZA VIRUS 4 BY PCR: NOT DETECTED
PATIENT TEMP: 37 C
PCO2: 24 MMHG (ref 35–45)
PCO2: 25.8 MMHG (ref 35–45)
PCO2: 28.9 MMHG (ref 35–45)
PCO2: 59.4 MMHG (ref 35–45)
PDW BLD-RTO: 18.1 FL (ref 11.5–15)
PDW BLD-RTO: 18.2 FL (ref 11.5–15)
PDW BLD-RTO: 18.2 FL (ref 11.5–15)
PDW BLD-RTO: 18.8 FL (ref 11.5–15)
PDW BLD-RTO: 19 FL (ref 11.5–15)
PDW BLD-RTO: 19.7 FL (ref 11.5–15)
PDW BLD-RTO: 20 FL (ref 11.5–15)
PDW BLD-RTO: 20.1 FL (ref 11.5–15)
PDW BLD-RTO: 20.5 FL (ref 11.5–15)
PEEP/CPAP: 5 CMH2O
PEEP/CPAP: 8 CMH2O
PEEP/CPAP: 8 CMH2O
PFO2: 0.59 MMHG/%
PFO2: 2.19 MMHG/%
PFO2: 2.41 MMHG/%
PH BLOOD GAS: 7.01 (ref 7.35–7.45)
PH BLOOD GAS: 7.27 (ref 7.35–7.45)
PH BLOOD GAS: 7.31 (ref 7.35–7.45)
PH BLOOD GAS: 7.34 (ref 7.35–7.45)
PH UA: 5 (ref 5–9)
PHOSPHORUS: 7.2 MG/DL (ref 2.5–4.5)
PLATELET # BLD: 1020 E9/L (ref 130–450)
PLATELET # BLD: 284 E9/L (ref 130–450)
PLATELET # BLD: 285 E9/L (ref 130–450)
PLATELET # BLD: 289 E9/L (ref 130–450)
PLATELET # BLD: 295 E9/L (ref 130–450)
PLATELET # BLD: 338 E9/L (ref 130–450)
PLATELET # BLD: 363 E9/L (ref 130–450)
PLATELET # BLD: 592 E9/L (ref 130–450)
PLATELET # BLD: 77 E9/L (ref 130–450)
PLATELET CONFIRMATION: NORMAL
PMV BLD AUTO: 10.5 FL (ref 7–12)
PMV BLD AUTO: 10.6 FL (ref 7–12)
PMV BLD AUTO: 10.8 FL (ref 7–12)
PMV BLD AUTO: 11.6 FL (ref 7–12)
PMV BLD AUTO: 11.7 FL (ref 7–12)
PMV BLD AUTO: 12.3 FL (ref 7–12)
PMV BLD AUTO: 12.5 FL (ref 7–12)
PO2: 131.7 MMHG (ref 75–100)
PO2: 240.8 MMHG (ref 75–100)
PO2: 59.3 MMHG (ref 75–100)
PO2: 66.1 MMHG (ref 75–100)
POIKILOCYTES: ABNORMAL
POLYCHROMASIA: ABNORMAL
POSITIVE QC PASS/FAIL: NORMAL
POTASSIUM REFLEX MAGNESIUM: 3.6 MMOL/L (ref 3.5–5)
POTASSIUM REFLEX MAGNESIUM: 4.8 MMOL/L (ref 3.5–5)
POTASSIUM REFLEX MAGNESIUM: 5.4 MMOL/L (ref 3.5–5)
POTASSIUM REFLEX MAGNESIUM: 5.7 MMOL/L (ref 3.5–5)
POTASSIUM SERPL-SCNC: 3.5 MMOL/L (ref 3.5–5)
POTASSIUM SERPL-SCNC: 3.8 MMOL/L (ref 3.5–5)
POTASSIUM SERPL-SCNC: 4 MMOL/L (ref 3.5–5)
POTASSIUM SERPL-SCNC: 5.3 MMOL/L (ref 3.5–5)
POTASSIUM SERPL-SCNC: 5.5 MMOL/L (ref 3.5–5)
PRO-BNP: 278 PG/ML (ref 0–125)
PRO-BNP: 8069 PG/ML (ref 0–125)
PROCALCITONIN: 45.64 NG/ML (ref 0–0.08)
PROTEIN FLUID: 2.9 G/DL
PROTEIN UA: ABNORMAL MG/DL
PROTHROMBIN TIME: 15.3 SEC (ref 9.3–12.4)
PROTHROMBIN TIME: 28.5 SEC (ref 9.3–12.4)
PROTHROMBIN TIME: 37.6 SEC (ref 9.3–12.4)
RBC # BLD: 3.86 E12/L (ref 3.5–5.5)
RBC # BLD: 4.04 E12/L (ref 3.5–5.5)
RBC # BLD: 4.23 E12/L (ref 3.5–5.5)
RBC # BLD: 4.45 E12/L (ref 3.5–5.5)
RBC # BLD: 4.49 E12/L (ref 3.5–5.5)
RBC # BLD: 4.51 E12/L (ref 3.5–5.5)
RBC # BLD: 4.65 E12/L (ref 3.5–5.5)
RBC # BLD: 4.7 E12/L (ref 3.5–5.5)
RBC # BLD: 4.71 E12/L (ref 3.5–5.5)
RBC FLUID: <2000 /UL
RBC UA: ABNORMAL /HPF (ref 0–2)
RESPIRATORY SYNCYTIAL VIRUS BY PCR: NOT DETECTED
RI(T): 175 %
RI(T): 193 %
RI(T): 960 %
RR MECHANICAL: 18 B/MIN
RR MECHANICAL: 22 B/MIN
RR MECHANICAL: 22 B/MIN
SALICYLATE, SERUM: <0.3 MG/DL (ref 0–30)
SARS-COV-2, PCR: NOT DETECTED
SCHISTOCYTES: ABNORMAL
SCHISTOCYTES: ABNORMAL
SEDIMENTATION RATE, ERYTHROCYTE: 10 MM/HR (ref 0–20)
SEDIMENTATION RATE, ERYTHROCYTE: 2 MM/HR (ref 0–20)
SODIUM BLD-SCNC: 129 MMOL/L (ref 132–146)
SODIUM BLD-SCNC: 130 MMOL/L (ref 132–146)
SODIUM BLD-SCNC: 131 MMOL/L (ref 132–146)
SODIUM BLD-SCNC: 131 MMOL/L (ref 132–146)
SODIUM BLD-SCNC: 132 MMOL/L (ref 132–146)
SODIUM BLD-SCNC: 132 MMOL/L (ref 132–146)
SODIUM BLD-SCNC: 134 MMOL/L (ref 132–146)
SODIUM BLD-SCNC: 136 MMOL/L (ref 132–146)
SODIUM BLD-SCNC: 139 MMOL/L (ref 132–146)
SODIUM URINE: 87 MMOL/L
SOURCE, BLOOD GAS: ABNORMAL
SPECIFIC GRAVITY UA: 1.02 (ref 1–1.03)
STREP GRP A PCR: NEGATIVE
T4 FREE: 0.3 NG/DL (ref 0.93–1.7)
T4 TOTAL: 2 MCG/DL (ref 4.5–11.7)
TARGET CELLS: ABNORMAL
TARGET CELLS: ABNORMAL
TEAR DROP CELLS: ABNORMAL
THB: 13.8 G/DL (ref 11.5–16.5)
THB: 14.1 G/DL (ref 11.5–16.5)
THB: 14.6 G/DL (ref 11.5–16.5)
THB: 14.8 G/DL (ref 11.5–16.5)
TIME ANALYZED: 1244
TIME ANALYZED: 1627
TIME ANALYZED: 1852
TIME ANALYZED: 638
TOTAL CK: 2770 U/L (ref 20–180)
TOTAL CK: 3085 U/L (ref 20–180)
TOTAL CK: 3548 U/L (ref 20–180)
TOTAL CK: 4082 U/L (ref 20–180)
TOTAL PROTEIN: 6.2 G/DL (ref 6.4–8.3)
TOTAL PROTEIN: 6.5 G/DL (ref 6.4–8.3)
TOTAL PROTEIN: 6.5 G/DL (ref 6.4–8.3)
TOTAL PROTEIN: 6.6 G/DL (ref 6.4–8.3)
TOTAL PROTEIN: 6.8 G/DL (ref 6.4–8.3)
TRICYCLIC ANTIDEPRESSANTS SCREEN SERUM: NEGATIVE NG/ML
TROPONIN: <0.01 NG/ML (ref 0–0.03)
TROPONIN: <0.01 NG/ML (ref 0–0.03)
TSH SERPL DL<=0.05 MIU/L-ACNC: 0.71 UIU/ML (ref 0.27–4.2)
TSH SERPL DL<=0.05 MIU/L-ACNC: 5.94 UIU/ML (ref 0.27–4.2)
UROBILINOGEN, URINE: 0.2 E.U./DL
VANCOMYCIN RANDOM: 25.7 MCG/ML (ref 5–40)
VT MECHANICAL: 400 ML
WBC # BLD: 0.9 E9/L (ref 4.5–11.5)
WBC # BLD: 10.4 E9/L (ref 4.5–11.5)
WBC # BLD: 14.1 E9/L (ref 4.5–11.5)
WBC # BLD: 17.8 E9/L (ref 4.5–11.5)
WBC # BLD: 2.1 E9/L (ref 4.5–11.5)
WBC # BLD: 20.9 E9/L (ref 4.5–11.5)
WBC # BLD: 23.8 E9/L (ref 4.5–11.5)
WBC # BLD: 24.6 E9/L (ref 4.5–11.5)
WBC # BLD: 6.7 E9/L (ref 4.5–11.5)
WBC UA: ABNORMAL /HPF (ref 0–5)

## 2020-01-01 PROCEDURE — 99284 EMERGENCY DEPT VISIT MOD MDM: CPT

## 2020-01-01 PROCEDURE — 85651 RBC SED RATE NONAUTOMATED: CPT

## 2020-01-01 PROCEDURE — 83690 ASSAY OF LIPASE: CPT

## 2020-01-01 PROCEDURE — 99214 OFFICE O/P EST MOD 30 MIN: CPT | Performed by: INTERNAL MEDICINE

## 2020-01-01 PROCEDURE — 83605 ASSAY OF LACTIC ACID: CPT

## 2020-01-01 PROCEDURE — 96361 HYDRATE IV INFUSION ADD-ON: CPT

## 2020-01-01 PROCEDURE — 84157 ASSAY OF PROTEIN OTHER: CPT

## 2020-01-01 PROCEDURE — 80202 ASSAY OF VANCOMYCIN: CPT

## 2020-01-01 PROCEDURE — 70450 CT HEAD/BRAIN W/O DYE: CPT

## 2020-01-01 PROCEDURE — 2580000003 HC RX 258: Performed by: NURSE PRACTITIONER

## 2020-01-01 PROCEDURE — 93005 ELECTROCARDIOGRAM TRACING: CPT | Performed by: EMERGENCY MEDICINE

## 2020-01-01 PROCEDURE — 80053 COMPREHEN METABOLIC PANEL: CPT

## 2020-01-01 PROCEDURE — 2580000003 HC RX 258: Performed by: INTERNAL MEDICINE

## 2020-01-01 PROCEDURE — 85378 FIBRIN DEGRADE SEMIQUANT: CPT

## 2020-01-01 PROCEDURE — 6370000000 HC RX 637 (ALT 250 FOR IP): Performed by: INTERNAL MEDICINE

## 2020-01-01 PROCEDURE — 82550 ASSAY OF CK (CPK): CPT

## 2020-01-01 PROCEDURE — 80076 HEPATIC FUNCTION PANEL: CPT

## 2020-01-01 PROCEDURE — 99213 OFFICE O/P EST LOW 20 MIN: CPT | Performed by: INTERNAL MEDICINE

## 2020-01-01 PROCEDURE — 86308 HETEROPHILE ANTIBODY SCREEN: CPT

## 2020-01-01 PROCEDURE — 85025 COMPLETE CBC W/AUTO DIFF WBC: CPT

## 2020-01-01 PROCEDURE — 93010 ELECTROCARDIOGRAM REPORT: CPT | Performed by: INTERNAL MEDICINE

## 2020-01-01 PROCEDURE — 6360000002 HC RX W HCPCS: Performed by: STUDENT IN AN ORGANIZED HEALTH CARE EDUCATION/TRAINING PROGRAM

## 2020-01-01 PROCEDURE — 6370000000 HC RX 637 (ALT 250 FOR IP): Performed by: STUDENT IN AN ORGANIZED HEALTH CARE EDUCATION/TRAINING PROGRAM

## 2020-01-01 PROCEDURE — 83735 ASSAY OF MAGNESIUM: CPT

## 2020-01-01 PROCEDURE — 36556 INSERT NON-TUNNEL CV CATH: CPT

## 2020-01-01 PROCEDURE — 84443 ASSAY THYROID STIM HORMONE: CPT

## 2020-01-01 PROCEDURE — 6370000000 HC RX 637 (ALT 250 FOR IP)

## 2020-01-01 PROCEDURE — 36415 COLL VENOUS BLD VENIPUNCTURE: CPT

## 2020-01-01 PROCEDURE — 94002 VENT MGMT INPAT INIT DAY: CPT

## 2020-01-01 PROCEDURE — 83615 LACTATE (LD) (LDH) ENZYME: CPT

## 2020-01-01 PROCEDURE — 82805 BLOOD GASES W/O2 SATURATION: CPT

## 2020-01-01 PROCEDURE — 88305 TISSUE EXAM BY PATHOLOGIST: CPT

## 2020-01-01 PROCEDURE — 82140 ASSAY OF AMMONIA: CPT

## 2020-01-01 PROCEDURE — 84145 PROCALCITONIN (PCT): CPT

## 2020-01-01 PROCEDURE — 5A1D70Z PERFORMANCE OF URINARY FILTRATION, INTERMITTENT, LESS THAN 6 HOURS PER DAY: ICD-10-PCS | Performed by: INTERNAL MEDICINE

## 2020-01-01 PROCEDURE — 71045 X-RAY EXAM CHEST 1 VIEW: CPT

## 2020-01-01 PROCEDURE — 86900 BLOOD TYPING SEROLOGIC ABO: CPT

## 2020-01-01 PROCEDURE — 2580000003 HC RX 258: Performed by: STUDENT IN AN ORGANIZED HEALTH CARE EDUCATION/TRAINING PROGRAM

## 2020-01-01 PROCEDURE — 85610 PROTHROMBIN TIME: CPT

## 2020-01-01 PROCEDURE — 83036 HEMOGLOBIN GLYCOSYLATED A1C: CPT

## 2020-01-01 PROCEDURE — 86706 HEP B SURFACE ANTIBODY: CPT

## 2020-01-01 PROCEDURE — 84484 ASSAY OF TROPONIN QUANT: CPT

## 2020-01-01 PROCEDURE — 6360000002 HC RX W HCPCS: Performed by: EMERGENCY MEDICINE

## 2020-01-01 PROCEDURE — 74018 RADEX ABDOMEN 1 VIEW: CPT

## 2020-01-01 PROCEDURE — 5A1935Z RESPIRATORY VENTILATION, LESS THAN 24 CONSECUTIVE HOURS: ICD-10-PCS | Performed by: EMERGENCY MEDICINE

## 2020-01-01 PROCEDURE — G0480 DRUG TEST DEF 1-7 CLASSES: HCPCS

## 2020-01-01 PROCEDURE — 84300 ASSAY OF URINE SODIUM: CPT

## 2020-01-01 PROCEDURE — 80074 ACUTE HEPATITIS PANEL: CPT

## 2020-01-01 PROCEDURE — 86850 RBC ANTIBODY SCREEN: CPT

## 2020-01-01 PROCEDURE — 87070 CULTURE OTHR SPECIMN AEROBIC: CPT

## 2020-01-01 PROCEDURE — 6360000002 HC RX W HCPCS: Performed by: INTERNAL MEDICINE

## 2020-01-01 PROCEDURE — 99223 1ST HOSP IP/OBS HIGH 75: CPT | Performed by: PHYSICIAN ASSISTANT

## 2020-01-01 PROCEDURE — 87502 INFLUENZA DNA AMP PROBE: CPT

## 2020-01-01 PROCEDURE — 82947 ASSAY GLUCOSE BLOOD QUANT: CPT

## 2020-01-01 PROCEDURE — 87205 SMEAR GRAM STAIN: CPT

## 2020-01-01 PROCEDURE — 83880 ASSAY OF NATRIURETIC PEPTIDE: CPT

## 2020-01-01 PROCEDURE — 82010 KETONE BODYS QUAN: CPT

## 2020-01-01 PROCEDURE — 84439 ASSAY OF FREE THYROXINE: CPT

## 2020-01-01 PROCEDURE — 2580000003 HC RX 258

## 2020-01-01 PROCEDURE — 87449 NOS EACH ORGANISM AG IA: CPT

## 2020-01-01 PROCEDURE — 82962 GLUCOSE BLOOD TEST: CPT

## 2020-01-01 PROCEDURE — 87040 BLOOD CULTURE FOR BACTERIA: CPT

## 2020-01-01 PROCEDURE — 82570 ASSAY OF URINE CREATININE: CPT

## 2020-01-01 PROCEDURE — 85730 THROMBOPLASTIN TIME PARTIAL: CPT

## 2020-01-01 PROCEDURE — 96376 TX/PRO/DX INJ SAME DRUG ADON: CPT

## 2020-01-01 PROCEDURE — 1200000000 HC SEMI PRIVATE

## 2020-01-01 PROCEDURE — 83986 ASSAY PH BODY FLUID NOS: CPT

## 2020-01-01 PROCEDURE — 86901 BLOOD TYPING SEROLOGIC RH(D): CPT

## 2020-01-01 PROCEDURE — 0202U NFCT DS 22 TRGT SARS-COV-2: CPT

## 2020-01-01 PROCEDURE — 37799 UNLISTED PX VASCULAR SURGERY: CPT

## 2020-01-01 PROCEDURE — 84100 ASSAY OF PHOSPHORUS: CPT

## 2020-01-01 PROCEDURE — 87880 STREP A ASSAY W/OPTIC: CPT

## 2020-01-01 PROCEDURE — 89051 BODY FLUID CELL COUNT: CPT

## 2020-01-01 PROCEDURE — 0W9930Z DRAINAGE OF RIGHT PLEURAL CAVITY WITH DRAINAGE DEVICE, PERCUTANEOUS APPROACH: ICD-10-PCS | Performed by: INTERNAL MEDICINE

## 2020-01-01 PROCEDURE — C9113 INJ PANTOPRAZOLE SODIUM, VIA: HCPCS | Performed by: INTERNAL MEDICINE

## 2020-01-01 PROCEDURE — 94003 VENT MGMT INPAT SUBQ DAY: CPT

## 2020-01-01 PROCEDURE — 2500000003 HC RX 250 WO HCPCS: Performed by: STUDENT IN AN ORGANIZED HEALTH CARE EDUCATION/TRAINING PROGRAM

## 2020-01-01 PROCEDURE — 80048 BASIC METABOLIC PNL TOTAL CA: CPT

## 2020-01-01 PROCEDURE — 84436 ASSAY OF TOTAL THYROXINE: CPT

## 2020-01-01 PROCEDURE — 80307 DRUG TEST PRSMV CHEM ANLYZR: CPT

## 2020-01-01 PROCEDURE — 71250 CT THORAX DX C-: CPT

## 2020-01-01 PROCEDURE — 02HV33Z INSERTION OF INFUSION DEVICE INTO SUPERIOR VENA CAVA, PERCUTANEOUS APPROACH: ICD-10-PCS | Performed by: INTERNAL MEDICINE

## 2020-01-01 PROCEDURE — 96372 THER/PROPH/DIAG INJ SC/IM: CPT

## 2020-01-01 PROCEDURE — 88112 CYTOPATH CELL ENHANCE TECH: CPT

## 2020-01-01 PROCEDURE — 74176 CT ABD & PELVIS W/O CONTRAST: CPT

## 2020-01-01 PROCEDURE — 32551 INSERTION OF CHEST TUBE: CPT

## 2020-01-01 PROCEDURE — 31500 INSERT EMERGENCY AIRWAY: CPT

## 2020-01-01 PROCEDURE — 96365 THER/PROPH/DIAG IV INF INIT: CPT

## 2020-01-01 PROCEDURE — 2000000000 HC ICU R&B

## 2020-01-01 PROCEDURE — 81001 URINALYSIS AUTO W/SCOPE: CPT

## 2020-01-01 PROCEDURE — 87081 CULTURE SCREEN ONLY: CPT

## 2020-01-01 PROCEDURE — 36620 INSERTION CATHETER ARTERY: CPT

## 2020-01-01 PROCEDURE — 2500000003 HC RX 250 WO HCPCS

## 2020-01-01 PROCEDURE — 2580000003 HC RX 258: Performed by: EMERGENCY MEDICINE

## 2020-01-01 PROCEDURE — 86140 C-REACTIVE PROTEIN: CPT

## 2020-01-01 PROCEDURE — 0BH18EZ INSERTION OF ENDOTRACHEAL AIRWAY INTO TRACHEA, VIA NATURAL OR ARTIFICIAL OPENING ENDOSCOPIC: ICD-10-PCS | Performed by: INTERNAL MEDICINE

## 2020-01-01 PROCEDURE — 99283 EMERGENCY DEPT VISIT LOW MDM: CPT

## 2020-01-01 PROCEDURE — 2500000003 HC RX 250 WO HCPCS: Performed by: INTERNAL MEDICINE

## 2020-01-01 RX ORDER — ETOMIDATE 2 MG/ML
INJECTION INTRAVENOUS
Status: COMPLETED
Start: 2020-01-01 | End: 2020-01-01

## 2020-01-01 RX ORDER — DEXTROSE MONOHYDRATE 25 G/50ML
INJECTION, SOLUTION INTRAVENOUS
Status: COMPLETED
Start: 2020-01-01 | End: 2020-01-01

## 2020-01-01 RX ORDER — LORAZEPAM 2 MG/ML
2 INJECTION INTRAMUSCULAR ONCE
Status: COMPLETED | OUTPATIENT
Start: 2020-01-01 | End: 2020-01-01

## 2020-01-01 RX ORDER — HEPARIN SODIUM 10000 [USP'U]/ML
5000 INJECTION, SOLUTION INTRAVENOUS; SUBCUTANEOUS EVERY 8 HOURS SCHEDULED
Status: DISCONTINUED | OUTPATIENT
Start: 2020-01-01 | End: 2020-12-18 | Stop reason: HOSPADM

## 2020-01-01 RX ORDER — DEXTROSE MONOHYDRATE 25 G/50ML
50 INJECTION, SOLUTION INTRAVENOUS PRN
Status: DISCONTINUED | OUTPATIENT
Start: 2020-01-01 | End: 2020-01-01

## 2020-01-01 RX ORDER — GLYCOPYRROLATE 0.2 MG/ML
0.2 INJECTION INTRAMUSCULAR; INTRAVENOUS EVERY 4 HOURS PRN
Status: DISCONTINUED | OUTPATIENT
Start: 2020-01-01 | End: 2020-12-18 | Stop reason: HOSPADM

## 2020-01-01 RX ORDER — FENTANYL CITRATE 50 UG/ML
25 INJECTION, SOLUTION INTRAMUSCULAR; INTRAVENOUS EVERY 4 HOURS PRN
Status: DISCONTINUED | OUTPATIENT
Start: 2020-01-01 | End: 2020-12-18 | Stop reason: HOSPADM

## 2020-01-01 RX ORDER — ONDANSETRON 2 MG/ML
4 INJECTION INTRAMUSCULAR; INTRAVENOUS EVERY 6 HOURS PRN
Status: DISCONTINUED | OUTPATIENT
Start: 2020-01-01 | End: 2020-12-18 | Stop reason: HOSPADM

## 2020-01-01 RX ORDER — FUROSEMIDE 40 MG/1
40 TABLET ORAL 2 TIMES DAILY
COMMUNITY
Start: 2020-01-01

## 2020-01-01 RX ORDER — CEFDINIR 300 MG/1
300 CAPSULE ORAL 2 TIMES DAILY
Qty: 20 CAPSULE | Refills: 0 | Status: SHIPPED | OUTPATIENT
Start: 2020-01-01 | End: 2020-01-01

## 2020-01-01 RX ORDER — ACETAMINOPHEN 325 MG/1
TABLET ORAL
Status: COMPLETED
Start: 2020-01-01 | End: 2020-01-01

## 2020-01-01 RX ORDER — DEXTROSE MONOHYDRATE 50 MG/ML
100 INJECTION, SOLUTION INTRAVENOUS PRN
Status: DISCONTINUED | OUTPATIENT
Start: 2020-01-01 | End: 2020-01-01

## 2020-01-01 RX ORDER — SODIUM CHLORIDE 9 MG/ML
10 INJECTION INTRAVENOUS DAILY
Status: DISCONTINUED | OUTPATIENT
Start: 2020-01-01 | End: 2020-12-18 | Stop reason: HOSPADM

## 2020-01-01 RX ORDER — PANTOPRAZOLE SODIUM 40 MG/10ML
40 INJECTION, POWDER, LYOPHILIZED, FOR SOLUTION INTRAVENOUS DAILY
Status: DISCONTINUED | OUTPATIENT
Start: 2020-01-01 | End: 2020-12-18 | Stop reason: HOSPADM

## 2020-01-01 RX ORDER — NICOTINE POLACRILEX 4 MG
LOZENGE BUCCAL
Status: DISPENSED
Start: 2020-01-01 | End: 2020-01-01

## 2020-01-01 RX ORDER — DEXTROSE, SODIUM CHLORIDE, SODIUM LACTATE, POTASSIUM CHLORIDE, AND CALCIUM CHLORIDE 5; .6; .31; .03; .02 G/100ML; G/100ML; G/100ML; G/100ML; G/100ML
1000 INJECTION, SOLUTION INTRAVENOUS CONTINUOUS
Status: DISCONTINUED | OUTPATIENT
Start: 2020-01-01 | End: 2020-01-01

## 2020-01-01 RX ORDER — 0.9 % SODIUM CHLORIDE 0.9 %
30 INTRAVENOUS SOLUTION INTRAVENOUS ONCE
Status: COMPLETED | OUTPATIENT
Start: 2020-01-01 | End: 2020-01-01

## 2020-01-01 RX ORDER — PROPOFOL 10 MG/ML
10 INJECTION, EMULSION INTRAVENOUS ONCE
Status: DISCONTINUED | OUTPATIENT
Start: 2020-01-01 | End: 2020-01-01

## 2020-01-01 RX ORDER — FENTANYL CITRATE 50 UG/ML
100 INJECTION, SOLUTION INTRAMUSCULAR; INTRAVENOUS ONCE
Status: DISCONTINUED | OUTPATIENT
Start: 2020-01-01 | End: 2020-01-01

## 2020-01-01 RX ORDER — SODIUM CHLORIDE 0.9 % (FLUSH) 0.9 %
10 SYRINGE (ML) INJECTION EVERY 12 HOURS SCHEDULED
Status: DISCONTINUED | OUTPATIENT
Start: 2020-01-01 | End: 2020-12-18 | Stop reason: HOSPADM

## 2020-01-01 RX ORDER — MORPHINE SULFATE 4 MG/ML
4 INJECTION, SOLUTION INTRAMUSCULAR; INTRAVENOUS ONCE
Status: COMPLETED | OUTPATIENT
Start: 2020-01-01 | End: 2020-01-01

## 2020-01-01 RX ORDER — PROMETHAZINE HYDROCHLORIDE 25 MG/1
12.5 TABLET ORAL EVERY 6 HOURS PRN
Status: DISCONTINUED | OUTPATIENT
Start: 2020-01-01 | End: 2020-12-18 | Stop reason: HOSPADM

## 2020-01-01 RX ORDER — HEPARIN SODIUM 1000 [USP'U]/ML
2300 INJECTION, SOLUTION INTRAVENOUS; SUBCUTANEOUS PRN
Status: DISCONTINUED | OUTPATIENT
Start: 2020-01-01 | End: 2020-12-18 | Stop reason: HOSPADM

## 2020-01-01 RX ORDER — NICOTINE POLACRILEX 4 MG
15 LOZENGE BUCCAL PRN
Status: DISCONTINUED | OUTPATIENT
Start: 2020-01-01 | End: 2020-12-18 | Stop reason: HOSPADM

## 2020-01-01 RX ORDER — SODIUM CHLORIDE 0.9 % (FLUSH) 0.9 %
10 SYRINGE (ML) INJECTION PRN
Status: DISCONTINUED | OUTPATIENT
Start: 2020-01-01 | End: 2020-12-18 | Stop reason: HOSPADM

## 2020-01-01 RX ORDER — DEXTROSE MONOHYDRATE 25 G/50ML
25 INJECTION, SOLUTION INTRAVENOUS ONCE
Status: COMPLETED | OUTPATIENT
Start: 2020-01-01 | End: 2020-01-01

## 2020-01-01 RX ORDER — LORAZEPAM 2 MG/ML
1 INJECTION INTRAMUSCULAR EVERY 30 MIN PRN
Status: DISCONTINUED | OUTPATIENT
Start: 2020-01-01 | End: 2020-12-18 | Stop reason: HOSPADM

## 2020-01-01 RX ORDER — CYPROHEPTADINE HYDROCHLORIDE 4 MG/1
4 TABLET ORAL 3 TIMES DAILY
COMMUNITY

## 2020-01-01 RX ORDER — SODIUM CHLORIDE 0.9 % (FLUSH) 0.9 %
10 SYRINGE (ML) INJECTION PRN
Status: DISCONTINUED | OUTPATIENT
Start: 2020-01-01 | End: 2020-01-01

## 2020-01-01 RX ORDER — LACTULOSE 10 G/15ML
20 SOLUTION ORAL 3 TIMES DAILY
Status: DISCONTINUED | OUTPATIENT
Start: 2020-01-01 | End: 2020-12-18 | Stop reason: HOSPADM

## 2020-01-01 RX ORDER — ONDANSETRON HYDROCHLORIDE 8 MG/1
8 TABLET, FILM COATED ORAL EVERY 8 HOURS PRN
COMMUNITY

## 2020-01-01 RX ORDER — 0.9 % SODIUM CHLORIDE 0.9 %
500 INTRAVENOUS SOLUTION INTRAVENOUS ONCE
Status: DISCONTINUED | OUTPATIENT
Start: 2020-01-01 | End: 2020-01-01 | Stop reason: HOSPADM

## 2020-01-01 RX ORDER — ACETAMINOPHEN 325 MG/1
650 TABLET ORAL EVERY 6 HOURS PRN
Status: DISCONTINUED | OUTPATIENT
Start: 2020-01-01 | End: 2020-12-18 | Stop reason: HOSPADM

## 2020-01-01 RX ORDER — PROPOFOL 10 MG/ML
10 INJECTION, EMULSION INTRAVENOUS ONCE
Status: COMPLETED | OUTPATIENT
Start: 2020-01-01 | End: 2020-01-01

## 2020-01-01 RX ORDER — CYPROHEPTADINE HYDROCHLORIDE 4 MG/1
4 TABLET ORAL 3 TIMES DAILY
Status: DISCONTINUED | OUTPATIENT
Start: 2020-01-01 | End: 2020-12-18 | Stop reason: HOSPADM

## 2020-01-01 RX ORDER — BISACODYL 10 MG
10 SUPPOSITORY, RECTAL RECTAL DAILY PRN
Status: DISCONTINUED | OUTPATIENT
Start: 2020-01-01 | End: 2020-12-18 | Stop reason: HOSPADM

## 2020-01-01 RX ORDER — ACETAMINOPHEN 650 MG/1
650 SUPPOSITORY RECTAL EVERY 6 HOURS PRN
Status: DISCONTINUED | OUTPATIENT
Start: 2020-01-01 | End: 2020-12-18 | Stop reason: HOSPADM

## 2020-01-01 RX ORDER — FLUOXETINE HYDROCHLORIDE 40 MG/1
40 CAPSULE ORAL DAILY
COMMUNITY

## 2020-01-01 RX ORDER — SODIUM CHLORIDE 9 MG/ML
INJECTION, SOLUTION INTRAVENOUS EVERY 12 HOURS
Status: DISCONTINUED | OUTPATIENT
Start: 2020-01-01 | End: 2020-12-18 | Stop reason: HOSPADM

## 2020-01-01 RX ORDER — DEXTROSE MONOHYDRATE 50 MG/ML
100 INJECTION, SOLUTION INTRAVENOUS PRN
Status: DISCONTINUED | OUTPATIENT
Start: 2020-01-01 | End: 2020-12-18 | Stop reason: HOSPADM

## 2020-01-01 RX ORDER — PROPOFOL 10 MG/ML
10 INJECTION, EMULSION INTRAVENOUS
Status: DISCONTINUED | OUTPATIENT
Start: 2020-01-01 | End: 2020-01-01 | Stop reason: ALTCHOICE

## 2020-01-01 RX ORDER — IBUPROFEN 600 MG/1
600 TABLET ORAL EVERY 6 HOURS PRN
COMMUNITY

## 2020-01-01 RX ORDER — POTASSIUM CHLORIDE 20 MEQ/1
TABLET, EXTENDED RELEASE ORAL
COMMUNITY
Start: 2020-01-01 | End: 2020-01-01

## 2020-01-01 RX ORDER — FUROSEMIDE 10 MG/ML
40 INJECTION INTRAMUSCULAR; INTRAVENOUS ONCE
Status: COMPLETED | OUTPATIENT
Start: 2020-01-01 | End: 2020-01-01

## 2020-01-01 RX ORDER — ACETAMINOPHEN 325 MG/1
650 TABLET ORAL ONCE
Status: COMPLETED | OUTPATIENT
Start: 2020-01-01 | End: 2020-01-01

## 2020-01-01 RX ORDER — DEXTROSE MONOHYDRATE 25 G/50ML
12.5 INJECTION, SOLUTION INTRAVENOUS PRN
Status: DISCONTINUED | OUTPATIENT
Start: 2020-01-01 | End: 2020-12-18 | Stop reason: HOSPADM

## 2020-01-01 RX ORDER — ROCURONIUM BROMIDE 10 MG/ML
INJECTION, SOLUTION INTRAVENOUS
Status: COMPLETED
Start: 2020-01-01 | End: 2020-01-01

## 2020-01-01 RX ORDER — DEXTROSE MONOHYDRATE 25 G/50ML
12.5 INJECTION, SOLUTION INTRAVENOUS PRN
Status: DISCONTINUED | OUTPATIENT
Start: 2020-01-01 | End: 2020-01-01

## 2020-01-01 RX ORDER — OCTREOTIDE ACETATE 200 UG/ML
200 INJECTION INTRAVENOUS
COMMUNITY

## 2020-01-01 RX ADMIN — CYPROHEPTADINE HYDROCHLORIDE 4 MG: 4 TABLET ORAL at 20:55

## 2020-01-01 RX ADMIN — SODIUM CHLORIDE, PRESERVATIVE FREE 10 ML: 5 INJECTION INTRAVENOUS at 09:34

## 2020-01-01 RX ADMIN — SODIUM ZIRCONIUM CYCLOSILICATE 5 G: 5 POWDER, FOR SUSPENSION ORAL at 09:34

## 2020-01-01 RX ADMIN — PROPOFOL 10 MCG/KG/MIN: 10 INJECTION, EMULSION INTRAVENOUS at 15:50

## 2020-01-01 RX ADMIN — FUROSEMIDE 40 MG: 10 INJECTION, SOLUTION INTRAMUSCULAR; INTRAVENOUS at 03:26

## 2020-01-01 RX ADMIN — LORAZEPAM 2 MG: 2 INJECTION INTRAMUSCULAR; INTRAVENOUS at 14:54

## 2020-01-01 RX ADMIN — CEFTRIAXONE 1 G: 1 INJECTION, POWDER, FOR SOLUTION INTRAMUSCULAR; INTRAVENOUS at 16:55

## 2020-01-01 RX ADMIN — LACTULOSE 20 G: 20 SOLUTION ORAL at 13:12

## 2020-01-01 RX ADMIN — NOREPINEPHRINE BITARTRATE 2 MCG/MIN: 1 INJECTION, SOLUTION, CONCENTRATE INTRAVENOUS at 22:00

## 2020-01-01 RX ADMIN — HEPARIN SODIUM 5000 UNITS: 10000 INJECTION, SOLUTION INTRAVENOUS; SUBCUTANEOUS at 21:30

## 2020-01-01 RX ADMIN — SODIUM BICARBONATE: 84 INJECTION, SOLUTION INTRAVENOUS at 13:18

## 2020-01-01 RX ADMIN — CEFEPIME 2 G: 2 INJECTION, POWDER, FOR SOLUTION INTRAMUSCULAR; INTRAVENOUS at 17:00

## 2020-01-01 RX ADMIN — ACETAMINOPHEN 650 MG: 325 TABLET ORAL at 14:11

## 2020-01-01 RX ADMIN — PROPOFOL 10 MCG/KG/MIN: 10 INJECTION, EMULSION INTRAVENOUS at 05:41

## 2020-01-01 RX ADMIN — DEXTROSE MONOHYDRATE 25 G: 25 INJECTION, SOLUTION INTRAVENOUS at 12:42

## 2020-01-01 RX ADMIN — SODIUM CHLORIDE, SODIUM LACTATE, POTASSIUM CHLORIDE, CALCIUM CHLORIDE AND DEXTROSE MONOHYDRATE 1000 ML: 5; 600; 310; 30; 20 INJECTION, SOLUTION INTRAVENOUS at 14:42

## 2020-01-01 RX ADMIN — SODIUM BICARBONATE: 84 INJECTION, SOLUTION INTRAVENOUS at 17:48

## 2020-01-01 RX ADMIN — ROCURONIUM BROMIDE 100 MG: 10 INJECTION INTRAVENOUS at 15:29

## 2020-01-01 RX ADMIN — Medication 10 ML: at 17:41

## 2020-01-01 RX ADMIN — SODIUM CHLORIDE 1998 ML: 9 INJECTION, SOLUTION INTRAVENOUS at 14:01

## 2020-01-01 RX ADMIN — SODIUM BICARBONATE 25 MEQ: 84 INJECTION, SOLUTION INTRAVENOUS at 17:30

## 2020-01-01 RX ADMIN — SODIUM CHLORIDE: 9 INJECTION, SOLUTION INTRAVENOUS at 09:34

## 2020-01-01 RX ADMIN — PROPOFOL 10 MCG/KG/MIN: 10 INJECTION, EMULSION INTRAVENOUS at 17:00

## 2020-01-01 RX ADMIN — LORAZEPAM 1 MG: 2 INJECTION INTRAMUSCULAR; INTRAVENOUS at 21:24

## 2020-01-01 RX ADMIN — DEXTROSE MONOHYDRATE 25 G: 25 INJECTION, SOLUTION INTRAVENOUS at 14:37

## 2020-01-01 RX ADMIN — MORPHINE SULFATE 4 MG: 4 INJECTION, SOLUTION INTRAMUSCULAR; INTRAVENOUS at 14:54

## 2020-01-01 RX ADMIN — SODIUM CHLORIDE, PRESERVATIVE FREE 10 ML: 5 INJECTION INTRAVENOUS at 20:56

## 2020-01-01 RX ADMIN — CYPROHEPTADINE HYDROCHLORIDE 4 MG: 4 TABLET ORAL at 09:34

## 2020-01-01 RX ADMIN — VANCOMYCIN HYDROCHLORIDE 1250 MG: 10 INJECTION, POWDER, LYOPHILIZED, FOR SOLUTION INTRAVENOUS at 17:48

## 2020-01-01 RX ADMIN — SODIUM CHLORIDE, PRESERVATIVE FREE 10 ML: 5 INJECTION INTRAVENOUS at 13:17

## 2020-01-01 RX ADMIN — ETOMIDATE 20 MG: 2 INJECTION, SOLUTION INTRAVENOUS at 15:29

## 2020-01-01 RX ADMIN — CYPROHEPTADINE HYDROCHLORIDE 4 MG: 4 TABLET ORAL at 13:12

## 2020-01-01 RX ADMIN — SODIUM ZIRCONIUM CYCLOSILICATE 5 G: 5 POWDER, FOR SUSPENSION ORAL at 13:13

## 2020-01-01 RX ADMIN — SODIUM BICARBONATE: 84 INJECTION, SOLUTION INTRAVENOUS at 02:50

## 2020-01-01 RX ADMIN — LACTULOSE 20 G: 20 SOLUTION ORAL at 09:34

## 2020-01-01 RX ADMIN — Medication 25 G: at 12:42

## 2020-01-01 RX ADMIN — LACTULOSE 20 G: 20 SOLUTION ORAL at 20:55

## 2020-01-01 RX ADMIN — SODIUM ZIRCONIUM CYCLOSILICATE 5 G: 5 POWDER, FOR SUSPENSION ORAL at 20:54

## 2020-01-01 RX ADMIN — MORPHINE SULFATE 1 MG/HR: 10 INJECTION INTRAVENOUS at 14:45

## 2020-01-01 RX ADMIN — TBO-FILGRASTIM 300 MCG: 300 INJECTION, SOLUTION SUBCUTANEOUS at 17:40

## 2020-01-01 RX ADMIN — RIFAXIMIN 550 MG: 550 TABLET ORAL at 13:12

## 2020-01-01 RX ADMIN — RIFAXIMIN 550 MG: 550 TABLET ORAL at 20:54

## 2020-01-01 RX ADMIN — PANTOPRAZOLE SODIUM 40 MG: 40 INJECTION, POWDER, FOR SOLUTION INTRAVENOUS at 13:13

## 2020-01-01 ASSESSMENT — ENCOUNTER SYMPTOMS
ABDOMINAL PAIN: 0
BACK PAIN: 0
NAUSEA: 0
VOMITING: 0
SHORTNESS OF BREATH: 0
COUGH: 0
SORE THROAT: 1
DIARRHEA: 0
RHINORRHEA: 0

## 2020-01-01 ASSESSMENT — PULMONARY FUNCTION TESTS
PIF_VALUE: 25
PIF_VALUE: 31
PIF_VALUE: 27
PIF_VALUE: 27
PIF_VALUE: 24
PIF_VALUE: 26
PIF_VALUE: 26
PIF_VALUE: 25
PIF_VALUE: 26
PIF_VALUE: 23
PIF_VALUE: 23
PIF_VALUE: 25
PIF_VALUE: 26
PIF_VALUE: 25
PIF_VALUE: 27
PIF_VALUE: 26
PIF_VALUE: 30
PIF_VALUE: 26
PIF_VALUE: 24
PIF_VALUE: 33
PIF_VALUE: 26
PIF_VALUE: 25
PIF_VALUE: 25

## 2020-01-01 ASSESSMENT — PATIENT HEALTH QUESTIONNAIRE - PHQ9
SUM OF ALL RESPONSES TO PHQ QUESTIONS 1-9: 1
SUM OF ALL RESPONSES TO PHQ QUESTIONS 1-9: 1
1. LITTLE INTEREST OR PLEASURE IN DOING THINGS: 0
SUM OF ALL RESPONSES TO PHQ9 QUESTIONS 1 & 2: 1
2. FEELING DOWN, DEPRESSED OR HOPELESS: 1

## 2020-01-01 ASSESSMENT — PAIN SCALES - GENERAL
PAINLEVEL_OUTOF10: 8
PAINLEVEL_OUTOF10: 0
PAINLEVEL_OUTOF10: 0
PAINLEVEL_OUTOF10: 2
PAINLEVEL_OUTOF10: 7

## 2020-01-14 NOTE — ED PROVIDER NOTES
patient if her temperature goes above 101.5F to return for evaluation and IV antibiotics. [BB]      ED Course User Index  [BB] Kenneth Sue, DO Adal Carr Fullum presents to the ED for evaluation of sore throat. Patient with history of neuroendocrine tumor and is on chemotherapy. Last blood work demonstrating neutropenia. Differential diagnoses included but not limited to strep pharnygitis, mono, influenza, viral illness. Workup in the ED revealed neutropenia with WBC of 0.9 and neutrophil count of 0.00. This case was discussed with patient's oncologist who requested the patient get Neupogen secondary to her neutrophil count being 0. Patient was also started on prophylactic antibiotics. Patient was given IV rocephin, fluids for their symptoms. Patient continues to be non-toxic on re-evaluation. Findings were discussed with the patient and reasons to immediately return to the ED were articulated to them. They will follow-up with their PCP and their oncologist.    --------------------------------------------- PAST HISTORY ---------------------------------------------  Past Medical History:  has a past medical history of Anxiety, Depression, Metastatic carcinoid tumor ([facility] [address] and Obesity. Past Surgical History:  has no past surgical history on file. Social History:  reports that she quit smoking about 5 months ago. Her smoking use included cigarettes. She has a 16.00 pack-year smoking history. She has never used smokeless tobacco. She reports that she does not drink alcohol. Family History: family history is not on file. The patients home medications have been reviewed.     Allergies: Seasonal    -------------------------------------------------- RESULTS -------------------------------------------------  Labs:  Results for orders placed or performed during the hospital encounter of 01/14/20   Strep Screen Group A Throat   Result Value Ref Range    Strep Grp A PCR Negative Negative   Rapid influenza INR 1.3    APTT   Result Value Ref Range    aPTT 29.1 24.5 - 35.1 sec   Lactic Acid, Plasma   Result Value Ref Range    Lactic Acid 1.8 0.5 - 2.2 mmol/L   Sedimentation Rate   Result Value Ref Range    Sed Rate 10 0 - 20 mm/Hr   TSH without Reflex   Result Value Ref Range    TSH 0.714 0.270 - 4.200 uIU/mL   Mononucleosis Screen   Result Value Ref Range    Mono Test Negative Negative   Platelet Confirmation   Result Value Ref Range    Platelet Confirmation CONFIRMED    EKG 12 Lead   Result Value Ref Range    Ventricular Rate 96 BPM    Atrial Rate 96 BPM    P-R Interval 146 ms    QRS Duration 68 ms    Q-T Interval 322 ms    QTc Calculation (Bazett) 406 ms    P Axis 57 degrees    R Axis 51 degrees    T Axis 61 degrees       Radiology:  XR CHEST PORTABLE   Final Result   No acute cardiopulmonary abnormality.          ------------------------- NURSING NOTES AND VITALS REVIEWED ---------------------------  Date / Time Roomed:  1/14/2020 11:24 AM  ED Bed Assignment:  17/17    The nursing notes within the ED encounter and vital signs as below have been reviewed. /82   Pulse 88   Temp 98.9 °F (37.2 °C) (Oral)   Resp 18   Ht 5' 7\" (1.702 m)   Wt 160 lb (72.6 kg)   SpO2 97%   BMI 25.06 kg/m²   Oxygen Saturation Interpretation: Normal      ------------------------------------------ PROGRESS NOTES ------------------------------------------  6:22 PM  I have spoken with the patient and discussed todays results, in addition to providing specific details for the plan of care and counseling regarding the diagnosis and prognosis. Their questions are answered at this time and they are agreeable with the plan. I discussed at length with them reasons for immediate return here for re evaluation. They will followup with their oncologist and primary care physician by calling their office tomorrow.       --------------------------------- ADDITIONAL PROVIDER NOTES ---------------------------------  At this time the patient is without objective evidence of an acute process requiring hospitalization or inpatient management. They have remained hemodynamically stable throughout their entire ED visit and are stable for discharge with outpatient follow-up. The plan has been discussed in detail and they are aware of the specific conditions for emergent return, as well as the importance of follow-up. Discharge Medication List as of 1/14/2020  5:47 PM      START taking these medications    Details   cefdinir (OMNICEF) 300 MG capsule Take 1 capsule by mouth 2 times daily for 10 days, Disp-20 capsule, R-0Print             Diagnosis:  1. Acute pharyngitis, unspecified etiology    2. Chemotherapy-induced neutropenia (HCC)    3. Thrombocytopenia ([facility] [address]        Disposition:  Patient's disposition: Discharge to home  Patient's condition is stable.          Kenneth Sue DO  Resident  01/15/20 6613

## 2020-01-21 NOTE — PROGRESS NOTES
[address] [facility]      20  Nisha Porter : 1975 Sex: female  Age: 53 y.o. Chief Complaint   Patient presents with    3 Month Follow-Up       HPI  Patient presents today accompanied by her  for follow-up visit on her medical problems. He was following at the [facility] with Dr. Daisha Walls. She had to have her chemotherapeutic agent stopped because of neutropenia and thrombocytopenia. She is apparently going to be seeing them upcoming for reevaluation of this. She was recently in the emergency room with a sore throat. I reviewed that encounter. Significant blood work was drawn and testing done including mono spot, strep screen as well as influenza testing all of which was negative. She had very low white count ER physician spoke with her oncologist who did recommend Neupogen and empiric antibiotic start. Patient was placed on Cefdinir which she still has a couple days ago. She is feeling better. She was referred to dermatology last visit for forehead skin lesion which has not been addressed yet. Also is due for mammogram which has not been obtained yet. I did stress compliance with all of this. Has not been feeling well recently and did not follow-up. Most recent blood work that was repeated by oncology in the last couple days did show her white count coming up from 0.9-2.1 and platelets up from 92,257 up to 290,000. Again she is feeling better. Hemotherapy was stopped does continue with Sandostatin. Review of Systems   Constitutional:  Negative for activity change, appetite change, chills, diaphoresis, fatigue,.  Her appetite has improved   HENT: Negative for congestion.    Eyes: Negative  Respiratory: Negative for cough, shortness of breath and wheezing.    Cardiovascular: Negative for chest pain, palpitations and leg swelling. Gastrointestinal: Positive for diarrhea (occasional) and nausea-much improved on treatment.   Negative for abdominal (OMNICEF) 300 MG capsule, Take 1 capsule by mouth 2 times daily for 10 days, Disp: 20 capsule, Rfl: 0    lanreotide acetate (SOMATULINE DEPOT) 120 MG/0.5ML SOLN depot injection, Inject 120 mg into the skin once, Disp: , Rfl:   Allergies   Allergen Reactions    Seasonal        Past Medical History:   Diagnosis Date    Anxiety     Depression     Metastatic carcinoid tumor ([facility] [address] 9/15/2019    Obesity      No past surgical history on file. No family history on file.   Social History     Socioeconomic History    Marital status:      Spouse name: Not on file    Number of children: Not on file    Years of education: Not on file    Highest education level: Not on file   Occupational History    Not on file   Social Needs    Financial resource strain: Not on file    Food insecurity:     Worry: Not on file     Inability: Not on file    Transportation needs:     Medical: Not on file     Non-medical: Not on file   Tobacco Use    Smoking status: Former Smoker     Packs/day: 1.00     Years: 16.00     Pack years: 16.00     Types: Cigarettes     Last attempt to quit: 2019     Years since quittin.4    Smokeless tobacco: Never Used   Substance and Sexual Activity    Alcohol use: Never     Frequency: Never    Drug use: Not on file    Sexual activity: Not on file   Lifestyle    Physical activity:     Days per week: Not on file     Minutes per session: Not on file    Stress: Not on file   Relationships    Social connections:     Talks on phone: Not on file     Gets together: Not on file     Attends Bahai service: Not on file     Active member of club or organization: Not on file     Attends meetings of clubs or organizations: Not on file     Relationship status: Not on file    Intimate partner violence:     Fear of current or ex partner: Not on file     Emotionally abused: Not on file     Physically abused: Not on file     Forced sexual activity: Not on file   Other Topics Concern    Not on file Social History Narrative    Not on file       Vitals:    01/20/20 1104   BP: 100/68   Pulse: 107   SpO2: 98%   Weight: 163 lb (73.9 kg)       Physical Exam    Constitutional: She is oriented to person, place, and time. She appears well-developed and well-nourished. No distress. Neck: Normal range of motion. Neck supple. No JVD present. No thyromegaly present. Cardiovascular: Normal rate, regular rhythm, normal heart sounds and intact distal pulses. Exam reveals no gallop and no friction rub.   Systolic murmur heard.-Recent 2D echo  Pulmonary/Chest: Effort normal and breath sounds normal. No respiratory distress. She has no wheezes. She has no rales. Abdominal: Soft. Bowel sounds are normal. She exhibits no distension. Nontender to palpation  Musculoskeletal: Normal range of motion. Lymphadenopathy:     She has no cervical axillary or inguinal adenopathy. Neurological: She is alert and oriented to person, place, and time. Skin: Skin is warm and dry. No rash noted. No erythema. She does have a lesion to her left forehead-see above--she does get flushing episodes with her carcinoid. Psychiatric: She has a normal mood and affect. Her behavior is normal      Assessment and Plan:  Lisandro Chacko was seen today for 3 month follow-up. Diagnoses and all orders for this visit:    Metastatic carcinoid tumor ([address]    Weight loss    Nausea    Flushing    Plan: Check on dermatology referral status when I see patient next. Encouraged her to get her mammogram done. Continue to follow with oncology. Encourage supplemental nutritional support with potentially [place] breakfast Ensure or boost.  Follow-up in 3 months and as needed. Notify us of problems in the interim. Return in about 3 months (around 4/20/2020). Seen By:  Rosario Maciel MD      *Document was created using voice recognition software. Note was reviewed however may contain grammatical errors.

## 2020-08-17 NOTE — PROGRESS NOTES
[address] [facility]     20  Nisha Porter : 1975 Sex: female  Age: 50 y.o. Chief Complaint   Patient presents with    Anxiety       HPI  Patient presents today accompanied by her  for follow-up visit. Patient has metastatic carcinoid and is being followed  Plan oncology at the [facility] in [facility]. I did review their note. Has had some recent scanning and lab work. Sounds like prognosis is relatively poor way to describe it. She does have significant fatigue. She had thoracentesis on the right side recently. She has lost 6 pounds since we saw her last  She did have TAE procedure to right side of liver back at the end of February. They have not been able to do the other side because of her generalized weakness and decreased conditioning. They do wish to do this if there is some recovery so she may be able to tolerate this better. States her appetite is been okay. They did increase her Lasix and potassium recently and she states she has a follow-up appointment upcoming in the next week and they are going to recheck labs on her at that point. She is also been referred to cardiac oncologist Whom they are having a virtual visit with today. Review of Systems       Constitutional:  Negative for activity change, appetite change, chills, diaphoresis, .  Her appetite has improved   patient does complain of fatigue and some lower extremity weakness and muscle achiness. HENT: Negative for congestion.    Eyes: Negative  Respiratory: Negative for cough, shortness of breath and wheezing.    Cardiovascular: Negative for chest pain, palpitations.  Positive for leg swelling  Gastrointestinal: Positive for diarrhea (occasional)-much improved on treatment.  Negative for abdominal pain, blood in stool, constipation,  vomiting. Endocrine: Negative.    Genitourinary: Negative for difficulty urinating, dysuria, frequency, hematuria and urgency.          Musculoskeletal: Negative for arthralgias, back pain, gait problem. Positive for lower extremity myalgia. She has discussed this with people in [place]. They asked her to try and be as active as she can with walking. Skin:  Negative for rash  Allergic/Immunologic: Positive for environmental allergies. Negative for immunocompromised state. Neurological: Negative for dizziness, weakness, light-headedness, numbness and headaches. Hematological/oncology- metastatic carcinoid tumor:   Psychiatric/Behavioral: Positive for dysphoric mood. The patient is nervous/anxious.-Some improvement recently.    Is currently on SSRI.     REST OF PERTINENT ROS GONE OVER AND WAS NEGATIVE. PMH:  Health Maintenance:  Mammogram - (2019)  Mammogram Screening - (2019)  Pelvic/Pap Exam - Declining-saw GYN  Medical Problems:  Obesity, Depression, Anxiety, metastatic carcinoid tumor (metastatic)-following with oncology [address]  (Surgeries)  FH: Father:  Lung Cancer - . Mother:  Hypothyroidism, glucose intolerance. Brother 1:  Hyperlipidemia. Sister 1:  PCOS. Sister 2:  Alcoholism. SH:  . (Marital)homemaker  Personal Habits: Cigarette Use: current cigarette smoker. Alcohol: Denies alcohol use. . (Drug  Use)        Current Outpatient Medications:     furosemide (LASIX) 40 MG tablet, Take 40 mg by mouth 2 times daily, Disp: , Rfl:     ibuprofen (ADVIL;MOTRIN) 600 MG tablet, Take 600 mg by mouth every 6 hours as needed, Disp: , Rfl:     potassium chloride (KLOR-CON M) 20 MEQ extended release tablet, Take 60 meq daily, Disp: , Rfl:     FLUoxetine (PROZAC) 40 MG capsule, Take 40 mg by mouth daily, Disp: , Rfl:     ondansetron (ZOFRAN) 8 MG tablet, Take 8 mg by mouth every 8 hours as needed for Nausea or Vomiting Take 1 tablet every 8 hrs prn, Disp: , Rfl:     Octreotide Acetate (SANDOSTATIN) 200 MCG/ML SOLN, Inject 200 mcg into the skin every 30 days , Disp: , Rfl:     cyproheptadine (PERIACTIN) 4 MG tablet, Take 4 mg by mouth 3 times daily, Disp: , Rfl:     lanreotide acetate (SOMATULINE DEPOT) 120 MG/0.5ML SOLN depot injection, Inject 120 mg into the skin once, Disp: , Rfl:   Allergies   Allergen Reactions    Seasonal        Past Medical History:   Diagnosis Date    Anxiety     Depression     Metastatic carcinoid tumor ([facility] [address] 9/15/2019    Obesity      No past surgical history on file. No family history on file.   Social History     Socioeconomic History    Marital status:      Spouse name: Not on file    Number of children: Not on file    Years of education: Not on file    Highest education level: Not on file   Occupational History    Not on file   Social Needs    Financial resource strain: Not on file    Food insecurity     Worry: Not on file     Inability: Not on file    Transportation needs     Medical: Not on file     Non-medical: Not on file   Tobacco Use    Smoking status: Former Smoker     Packs/day: 1.00     Years: 16.00     Pack years: 16.00     Types: Cigarettes     Last attempt to quit: 2019     Years since quittin.0    Smokeless tobacco: Never Used   Substance and Sexual Activity    Alcohol use: Never     Frequency: Never    Drug use: Not on file    Sexual activity: Not on file   Lifestyle    Physical activity     Days per week: Not on file     Minutes per session: Not on file    Stress: Not on file   Relationships    Social connections     Talks on phone: Not on file     Gets together: Not on file     Attends Lutheran service: Not on file     Active member of club or organization: Not on file     Attends meetings of clubs or organizations: Not on file     Relationship status: Not on file    Intimate partner violence     Fear of current or ex partner: Not on file     Emotionally abused: Not on file     Physically abused: Not on file     Forced sexual activity: Not on file   Other Topics Concern    Not on file   Social History Narrative    Not on file       Vitals: 08/17/20 1027   BP: 110/62   Pulse: 100   Temp: 97.4 °F (36.3 °C)   TempSrc: Temporal   SpO2: 97%   Weight: 154 lb 6.4 oz (70 kg)   Height: 5' 7\" (1.702 m)       Physical Exam    Constitutional: She is oriented to person, place, and time. She appears well-developed and well-nourished. No distress. She does appear fatigued and weak  Neck: Normal range of motion. Neck supple. No JVD present. No thyromegaly present. Cardiovascular: Normal rate, regular rhythm, normal heart sounds and intact distal pulses. Exam reveals no gallop and no friction rub.   Systolic murmur heard.-Recent 2D echo  Pulmonary/Chest: Effort normal.  She does have diminished breath sounds on the right is the side that she did have thoracentesis on recently. No respiratory distress. She has no wheezes. She has no rales. Abdominal: Soft. Bowel sounds are normal. She exhibits mild distention nontender to palpation  Musculoskeletal: Normal range of motion. Lymphadenopathy:     She has no cervical axillary or inguinal adenopathy. Neurological: She is alert and oriented to person, place, and time. Skin: Skin is warm and dry. No rash noted. No erythema.  She does get flushing from her carcinoid   psychiatric: She has a normal mood and affect. Her behavior is normal         Assessment and Plan:  Janeth Dee was seen today for anxiety. Diagnoses and all orders for this visit:    Metastatic carcinoid tumor ([address]  Disease progressive. Following with [facility]. Flushing  Stable on medication. Weight loss    Recurrent major depressive disorder, remission status unspecified ([address]  Appears to be stable on SSRI. Plan: I will see her back in 4 months and as needed. See above body report. Fall precautions. We did discuss compression stockings which she is going to obtain and consideration for physical therapy which they wish to wait on. I did review the note from the [facility].   There was no blood work included but she stated that she had everything done recently and I did look over the results on patient's 's phone. He is going to try to get them to send an actual copy to us. This visit lasted greater than 25 minutes face-to-face time with greater than 50% of time in counseling coordination of care and review of outside information. I did recommend she try some lidocaine patches for the leg achiness is unable to give her anything at this time because of her liver mets and concern with interactions. Fall precautions. I did she suggest consideration for physical therapy which they wish to wait on currently. Notify us of problems in the interim. No follow-ups on file. Seen By:  Liza Irving MD      *Document was created using voice recognition software. Note was reviewed however may contain grammatical errors.

## 2020-12-16 PROBLEM — E66.9 OBESITY (BMI 30.0-34.9): Status: RESOLVED | Noted: 2019-06-10 | Resolved: 2020-01-01

## 2020-12-16 PROBLEM — J96.00 ACUTE RESPIRATORY FAILURE (HCC): Status: ACTIVE | Noted: 2020-01-01

## 2020-12-16 PROBLEM — J96.01 ACUTE RESPIRATORY FAILURE WITH HYPOXIA (HCC): Status: ACTIVE | Noted: 2020-01-01

## 2020-12-16 PROBLEM — N17.9 AKI (ACUTE KIDNEY INJURY) (HCC): Status: ACTIVE | Noted: 2020-01-01

## 2020-12-16 NOTE — ED PROVIDER NOTES
Krista Mitchell is a 50 y.o. female with a PMHx significant for neuroendocrine / carcinoid tumor who presents for evaluation of altered mental status and decline in [place], beginning yesterday. The complaint has been persistent, severe in severity, and worsened by nothing. The patient is unable to tell us anything. Per her  who brought her in, the patient was recently [place] [facility]. She has not been wanting to eat or drink while at home for the last few days.  notes that she was getting more fatigued yesterday. This morning the patient was more dyspneic and altered.  notes that she would want everything done. She recently was informed of GEMINI, worsening liver function, ascites (had 5L drained) and some fluid on her lungs. The history is provided by medical records and the spouse. Review of Systems   Unable to perform ROS: Acuity of condition        Physical Exam  Vitals signs and nursing note reviewed. Constitutional:       General: She is in acute distress. Appearance: She is cachectic. She is ill-appearing, toxic-appearing and diaphoretic. HENT:      Right Ear: External ear normal.      Left Ear: External ear normal.      Mouth/Throat:      Mouth: Mucous membranes are dry. Pharynx: No oropharyngeal exudate or posterior oropharyngeal erythema. Eyes:      General:         Right eye: No discharge. Left eye: No discharge. Extraocular Movements: Extraocular movements intact. Conjunctiva/sclera: Conjunctivae normal.   Cardiovascular:      Rate and Rhythm: Normal rate and regular rhythm. Heart sounds: No murmur. Comments: Weak pulses in the periphery    Pulmonary:      Breath sounds: Examination of the right-upper field reveals rhonchi. Examination of the left-upper field reveals rhonchi. Examination of the right-lower field reveals rhonchi. Examination of the left-lower field reveals rhonchi.  Decreased breath sounds and rhonchi present. No wheezing. Abdominal:      General: There is no distension. Palpations: There is no mass. Tenderness: There is no abdominal tenderness. There is no guarding or rebound. Hernia: No hernia is present. Comments: Mottling noticed on the abdomen   Musculoskeletal: Normal range of motion. General: No swelling or deformity. Skin:     Comments: Cool skin  Cyanosis to extremities     Neurological:      Mental Status: She is lethargic. GCS: GCS eye subscore is 4. GCS verbal subscore is 2. GCS motor subscore is 5. Comments: Followed some commands            Procedures     Arterial Access Procedure Note  Indication: Need blood for laboratory evaluation    Procedure: The patient was placed in the appropriate position and the skin over the puncture site was prepped with alcohol. Intraarterial access was obtained in Right Radial Artery and the site was secured appropriately. Procedure was preformed without live ultrasound for guidance. The patient tolerated the procedure well. Complications: None          Central Line Placement Procedure Note  Indication: vascular access, long term access and centrally administered medications  Consent: Unable to be obtained due to the emergent nature of this procedure. Procedure: The patient was positioned appropriately and the skin over the right femoral vein was prepped with betadine and draped in a sterile fashion. Local anesthesia was obtained by infiltration using 1% Lidocaine without epinephrine. A large bore needle was used to identify the vein. A guide wire was then inserted into the vein through the needle. A triple lumen catheter was then inserted into the vessel over the guide wire using the Seldinger technique. All ports showed good, free flowing blood return and were flushed with saline solution. The catheter was then securely fastened to the skin with suture at 20 cm.   Two sutures were placed into the proximal eyelets. An antibiotic disk was placed and the site was then covered with a sterile dressing. A post procedure X-ray was not indicated. The patient tolerated the procedure well. Complications: Bleeding      Intubation Procedure Note  Indication: impending respiratory failure and airway protection  Consent: Unable to be obtained due to the emergent nature of this procedure; spoke with  wants everything done  Medications Used: etomidate intravenously and rocuronium intravenously  Procedure: The patient was placed in the appropriate position. Cricoid pressure was not required. Intubation was performed by direct laryngoscopy using a glidescope and an 8.0 cuffed endotracheal tube. The cuff was then inflated and the tube was secured appropriately at a distance of 24 cm to the dental ridge. Initial confirmation of placement included bilateral breath sounds, an end tidal CO2 detector, absence of sounds over the stomach, tube fogging and adequate chest rise. A chest x-ray to verify correct placement of the tube has been ordered but is still pending. The patient tolerated the procedure well. Complications: None        Arterial Line Placement Procedure Note        Indication: metabolic derangement, arterial blood gases, mechanical ventilation and severe hypotension  Consent: Unable to be obtained due to the emergent nature of this procedure. Preet's Test: Not indicated in this particular procedure  Procedure: The skin over the right femoral artery was prepped with betadine and draped in a sterile fashion. Local anesthesia was obtained by infiltration using 1% Lidocaine without epinephrine. A 20 gauge angiocath was then inserted, over a needle, into the vessel. The transducer set was then attached and securely fastened to the skin with sutures. Waveforms on the monitor were observed and found to be adequate. The patient had good distal perfusion after the procedure.  The site was then dressed in a sterile fashion. The patient tolerated the procedure well. Complications: bleeding      Peripheral Line Placement Procedure Note  Indication: Poor peripheral access, lack of vascular access  Consent: Unable to be obtained due to the emergent nature of this procedure. Procedure: The patient was positioned appropriately and the skin over the left arm was prepped with alcohol. A tourniquet was placed proximal to the vein's location and ultrasound was utilized to identify the vein. A 20 gauge angiocath was inserted into the vein with ultrasound guidance, with the catheter being visualized as it was advanced within the vein. Blood was obtained from the venous access site if needed and the site was secured with a leur lock and a tegaderm adhesive bandage. The patient tolerated the procedure well. Complications: None      MDM     ED Course as of Dec 16 1643   Wed Dec 16, 2020   1432 Patient reevaluated, once and hypoglycemic. Amp of D50 and D5 LR was ordered at this time. [BB]   [facility], discussed the patient. She will admit the patient. [BB]   1520 Went to reevaluate the patient, she is more unresponsive and hypoxic at this point time. Decision made to intubate.    [BB]   [phone] with Dr. Eliane Mercado, discussed the patient. He will admit the patient. [BB]   1614 EKG: This EKG is signed and interpreted by me. Rate: 86  Rhythm: Sinus  Interpretation: non-specific EKG no ST elevations, no ST depressions, QTC of 466  Comparison: Unspecific changes compared to previous EKG 01/14/2020      [BB]   1631 Spoke with RT, ET tube pulled back 2 cm based on imaging. [BB]   1631 Rate increased   pH, Blood Gas(!!): 7.008 [BB]      ED Course User Index  [BB] DO Aric Varghese presents to the ED for evaluation of altered mental status and health decline. Patient with history of neuroendocrine tumor, recently discharged from [place].    states she has not been eating or drinking the past couple of days. States today she was more altered and not responding. Workup in the ED revealed patient that is hypoglycemic and responding to pain. Peripheral line inserted to the left upper extremity and amp of D50 was given. Difficulty with vascular access and patient did not have pressure therefore decision was made to place central line and arterial line. labs ordered as well as imaging. Lactic acidosis, and hypotension therefore fluids ordered. Once A-line was placed patient with good pressure, therefore pressors were not started. CT chest showing large pleural effusion and concern for COVID. Rapid COVID ordered and sent. Case was discussed with Dr. Laurel Steele, Critical care who will accept the patient. Will hold on fluids and give dextrose PRN. On re-evaluation the patient is becoming less responsive and more hypoxic, decision was made to intubate. First dose of abx given in the ER. Second ABG showing worsening acidosis as well as increased CO2. Respiratory rate was increased. Patient requires continued workup and management of their symptoms and will be admitted to the hospital for further evaluation and treatment.    --------------------------------------------- PAST HISTORY ---------------------------------------------  Past Medical History:  has a past medical history of Anxiety, Depression, Metastatic carcinoid tumor ([address] and Obesity. Past Surgical History:  has no past surgical history on file. Social History:  reports that she quit smoking about 16 months ago. Her smoking use included cigarettes. She has a 16.00 pack-year smoking history. She has never used smokeless tobacco. She reports that she does not drink alcohol. Family History: family history is not on file. The patients home medications have been reviewed.     Allergies: Seasonal    -------------------------------------------------- RESULTS -------------------------------------------------    Lab  Results for orders placed or performed during the hospital encounter of 12/16/20   Blood Gas, Arterial   Result Value Ref Range    Date Analyzed 20201216     Time Analyzed 1244     Source: Blood Arterial     pH, Blood Gas 7.313 (L) 7.350 - 7.450    PCO2 28.9 (L) 35.0 - 45.0 mmHg    PO2 66.1 (L) 75.0 - 100.0 mmHg    HCO3 14.3 (L) 22.0 - 26.0 mmol/L    B.E. -10.3 (L) -3.0 - 3.0 mmol/L    O2 Sat 90.7 (L) 92.0 - 98.5 %    O2Hb 89.6 (L) 94.0 - 97.0 %    COHb 1.2 0.0 - 1.5 %    MetHb 0.0 0.0 - 1.5 %    O2 Content 18.6 mL/dL    HHb 9.2 (H) 0.0 - 5.0 %    tHb (est) 14.8 11.5 - 16.5 g/dL    Date Of Collection      Time Collected      Pt Temp 37.0 C     ID [id number]     Lab [id number]     Critical(s) Notified .  No Critical Values    CBC Auto Differential   Result Value Ref Range    WBC 17.8 (H) 4.5 - 11.5 E9/L    RBC 4.51 3.50 - 5.50 E12/L    Hemoglobin 13.2 11.5 - 15.5 g/dL    Hematocrit 39.3 34.0 - 48.0 %    MCV 87.1 80.0 - 99.9 fL    MCH 29.3 26.0 - 35.0 pg    MCHC 33.6 32.0 - 34.5 %    RDW 18.2 (H) 11.5 - 15.0 fL    Platelets 791 222 - 516 E9/L    MPV 11.7 7.0 - 12.0 fL    Neutrophils % 84.4 (H) 43.0 - 80.0 %    Immature Granulocytes % 1.3 0.0 - 5.0 %    Lymphocytes % 11.1 (L) 20.0 - 42.0 %    Monocytes % 2.9 2.0 - 12.0 %    Eosinophils % 0.1 0.0 - 6.0 %    Basophils % 0.2 0.0 - 2.0 %    Neutrophils Absolute 15.00 (H) 1.80 - 7.30 E9/L    Immature Granulocytes # 0.23 E9/L    Lymphocytes Absolute 1.97 1.50 - 4.00 E9/L    Monocytes Absolute 0.51 0.10 - 0.95 E9/L    Eosinophils Absolute 0.02 (L) 0.05 - 0.50 E9/L    Basophils Absolute 0.03 0.00 - 0.20 E9/L   Hepatic Function Panel   Result Value Ref Range    Total Protein 6.5 6.4 - 8.3 g/dL    Alb 1.9 (L) 3.5 - 5.2 g/dL    Alkaline Phosphatase 750 (H) 35 - 104 U/L    ALT 50 (H) 0 - 32 U/L     (H) 0 - 31 U/L    Total Bilirubin 1.4 (H) 0.0 - 1.2 mg/dL    Bilirubin, Direct 1.1 (H) 0.0 - 0.3 mg/dL    Bilirubin, Indirect 0.3 0.0 - 1.0 mg/dL   Basic Metabolic Panel   Result Value Ref Range Sodium 131 (L) 132 - 146 mmol/L    Potassium 5.3 (H) 3.5 - 5.0 mmol/L    Chloride 97 (L) 98 - 107 mmol/L    CO2 17 (L) 22 - 29 mmol/L    Anion Gap 17 (H) 7 - 16 mmol/L    Glucose 123 (H) 74 - 99 mg/dL    BUN 48 (H) 6 - 20 mg/dL    CREATININE 2.6 (H) 0.5 - 1.0 mg/dL    GFR Non-African American 20 >=60 mL/min/1.73    GFR African American 24     Calcium 9.0 8.6 - 10.2 mg/dL   Troponin   Result Value Ref Range    Troponin <0.01 0.00 - 0.03 ng/mL   Brain Natriuretic Peptide   Result Value Ref Range    Pro-BNP 8,069 (H) 0 - 125 pg/mL   CK   Result Value Ref Range    Total CK 2,770 (H) 20 - 180 U/L   D-Dimer, Quantitative   Result Value Ref Range    D-Dimer, Quant 2235 ng/mL DDU   Lactic Acid, Plasma   Result Value Ref Range    Lactic Acid 5.3 (HH) 0.5 - 2.2 mmol/L   Lipase   Result Value Ref Range    Lipase 6 (L) 13 - 60 U/L   Ammonia   Result Value Ref Range    Ammonia 74.3 (H) 11.0 - 51.0 umol/L   Urinalysis   Result Value Ref Range    Color, UA Yellow Straw/Yellow    Clarity, UA Clear Clear    Glucose, Ur Negative Negative mg/dL    Bilirubin Urine SMALL (A) Negative    Ketones, Urine Negative Negative mg/dL    Specific Gravity, UA 1.020 1.005 - 1.030    Blood, Urine Negative Negative    pH, UA 5.0 5.0 - 9.0    Protein, UA TRACE Negative mg/dL    Urobilinogen, Urine 0.2 <2.0 E.U./dL    Nitrite, Urine Negative Negative    Leukocyte Esterase, Urine Negative Negative   Serum Drug Screen   Result Value Ref Range    Ethanol Lvl <10 mg/dL    Acetaminophen Level 7.5 (L) 10.0 - 91.5 mcg/mL    Salicylate, Serum <6.8 0.0 - 30.0 mg/dL   Protime-INR   Result Value Ref Range    Protime 28.5 (H) 9.3 - 12.4 sec    INR 2.4    APTT   Result Value Ref Range    aPTT 43.1 (H) 24.5 - 35.1 sec   Beta-Hydroxybutyrate   Result Value Ref Range    Beta-Hydroxybutyrate 0.38 (H) 0.02 - 0.27 mmol/L   Magnesium   Result Value Ref Range    Magnesium 2.5 1.6 - 2.6 mg/dL   Sedimentation Rate   Result Value Ref Range    Sed Rate 2 0 - 20 mm/Hr   TSH without Reflex   Result Value Ref Range    TSH 5.940 (H) 0.270 - 4.200 uIU/mL   T4   Result Value Ref Range    T4, Total 2.0 (L) 4.5 - 11.7 mcg/dL   Microscopic Urinalysis   Result Value Ref Range    Hyaline Casts, UA 3-5 (A) 0 - 2 /LPF    WBC, UA NONE 0 - 5 /HPF    RBC, UA NONE 0 - 2 /HPF    Bacteria, UA NONE SEEN None Seen /HPF   Lactic Acid, Plasma   Result Value Ref Range    Lactic Acid 6.5 (HH) 0.5 - 2.2 mmol/L   Blood Gas, Arterial   Result Value Ref Range    Date Analyzed 20201216     Time Analyzed 1627     Source: Blood Arterial     pH, Blood Gas 7.008 (LL) 7.350 - 7.450    PCO2 59.4 (H) 35.0 - 45.0 mmHg    PO2 59.3 (L) 75.0 - 100.0 mmHg    HCO3 14.6 (L) 22.0 - 26.0 mmol/L    B.E. -17.0 (L) -3.0 - 3.0 mmol/L    O2 Sat 74.3 (L) 92.0 - 98.5 %    PO2/FIO2 0.59 mmHg/%    AaDO2 569.3 mmHg    RI(T) 960 %    O2Hb 73.3 (L) 94.0 - 97.0 %    COHb 0.9 0.0 - 1.5 %    MetHb 0.4 0.0 - 1.5 %    O2 Content 14.6 mL/dL    HHb 25.4 (H) 0.0 - 5.0 %    tHb (est) 14.1 11.5 - 16.5 g/dL    Mode AC     FIO2 100.0 %    Rr Mechanical 18.0 b/min    Vt Mechanical 400.0 mL    Peep/Cpap 5.0 cmH2O    Date Of Collection      Time Collected      Pt Temp 37.0 C     ID [id number]     Lab [id number]     Critical(s) Notified Handed report to Dr/RN    POCT Glucose   Result Value Ref Range    Meter Glucose <40 (L) 74 - 99 mg/dL   POCT Glucose   Result Value Ref Range    Meter Glucose 142 (H) 74 - 99 mg/dL   POC Pregnancy Urine   Result Value Ref Range    HCG, Urine, POC Negative Negative    Lot Number [id number]     Positive QC Pass/Fail Acceptable     Negative QC Pass/Fail Acceptable    POCT Glucose   Result Value Ref Range    Meter Glucose 83 74 - 99 mg/dL   POCT Glucose   Result Value Ref Range    Meter Glucose 69 (L) 74 - 99 mg/dL   POCT Glucose   Result Value Ref Range    Meter Glucose 219 (H) 74 - 99 mg/dL   EKG 12 Lead   Result Value Ref Range    Ventricular Rate 86 BPM    Atrial Rate 86 BPM    P-R Interval 166 ms    QRS Duration 76 ms    Q-T Interval 390 ms    QTc Calculation (Bazett) 466 ms    P Axis 60 degrees    R Axis 33 degrees    T Axis 91 degrees       Radiology  XR CHEST PORTABLE   Final Result   1. Endotracheal tube is approximately 1 cm above the sandra. This tube   could be retracted approximately 2 cm. 2.  Diffuse airspace opacities throughout both lungs suggesting interstitial   pulmonary edema or pneumonia with likely right pleural effusion. XR ABDOMEN FOR NG/OG/NE TUBE PLACEMENT   Final Result   The tip of the nasogastric tube overlies the stomach. CT Head WO Contrast   Final Result   No significant abnormal findings. Moderate image degradation from motion. CT CHEST WO CONTRAST   Final Result   1. Very large bilateral pleural effusions, right greater than left. The   right pleural effusion occupies at least 70-75% of the right hemithorax. 2. Multifocal bilateral ground-glass and semi solid pulmonary infiltrates   most consistent with COVID-19 pneumonia   3. Compression atelectasis of the right lower lobe with collapse of the right   lower lobe. 4. Extensive metastatic disease to liver. There is virtually no residual   normal liver parenchyma. CT ABDOMEN PELVIS WO CONTRAST Additional Contrast? None   Final Result   Progression of extensive metastases to the l enlarged liver. Interval development of ascites. New large bilateral pleural effusion and bibasilar lung infiltrates. Extensive subcutaneous edema. XR CHEST PORTABLE   Final Result   Moderate right-sided pleural effusion and small left-sided pleural effusion   with compressive atelectasis.      ------------------------ NURSING NOTES AND VITALS REVIEWED ---------------------------  Date / Time Roomed:  12/16/2020 12:27 PM  ED Bed Assignment:  27/27    The nursing notes within the ED encounter and vital signs as below have been reviewed.    Patient Vitals for the past 24 hrs:   BP Temp Temp src Pulse Resp SpO2 Height Weight   12/16/20 1559 (!) 138/59 93.9 °F (34.4 °C) -- 87 18 (!) 88 % -- --   12/16/20 1556 -- -- -- -- -- (!) 88 % -- --   12/16/20 1549 (!) 123/55 93.9 °F (34.4 °C) -- 83 16 (!) 88 % -- --   12/16/20 1545 -- -- -- -- -- (!) 88 % -- --   12/16/20 1536 (!) 87/41 -- -- 81 -- 92 % -- --   12/16/20 1524 138/61 -- -- 89 22 (!) 87 % -- --   12/16/20 1406 (!) 118/49 95.7 °F (35.4 °C) -- 83 24 94 % -- --   12/16/20 1325 -- -- -- -- -- -- 5' 7\" (1.702 m) 146 lb 14.4 oz (66.6 kg)   12/16/20 1259 -- 94 °F (34.4 °C) Bladder 89 22 (!) 80 % -- --   12/16/20 1249 -- 94.3 °F (34.6 °C) Bladder 90 20 90 % -- --       Oxygen Saturation Interpretation: Abnormal      ------------------------------------------ PROGRESS NOTES ------------------------------------------  I have spoken with the patient 's  and discussed todays results, in addition to providing specific details for the plan of care and counseling regarding the diagnosis and prognosis. Their questions are answered at this time and they are agreeable with the plan.      --------------------------------- ADDITIONAL PROVIDER NOTES ---------------------------------  Consultations:  Spoke with Dr. Haresh Sim,  They will provide consultation in the ICU. Spoke with Dr. Alexander Melvin, he will admit the patient. This patient's ED course included: a personal history and physicial examination, re-evaluation prior to disposition, multiple bedside re-evaluations, IV medications, cardiac monitoring, continuous pulse oximetry and complex medical decision making and emergency management    This patient has remained hemodynamically stable and been closely monitored during their ED course. Please note that the withdrawal or failure to initiate urgent interventions for this patient would likely result in a life threatening deterioration or permanent disability. Accordingly this patient received 80 minutes of critical care time, excluding separately billable procedures. Clinical Impression  1.  GEMINI (acute

## 2020-12-16 NOTE — TELEPHONE ENCOUNTER
Rojelio Ramey called back asking if there was a response. I told him that there has not been yet, and may not get one as Dr Kristina Moore is out of the office. He stated that since her situation is not good, he is going to call an ambulance and have her taken to [facility] today.
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
## 2020-12-16 NOTE — H&P
Internal Medicine History & Physical     Name: Tamara Randhawa  : 1975  Chief Complaint: Altered Mental Status  Primary Care Physician: Ynes Welch MD  Admission date: 2020  Date of service: 2020     History of Present Illness  Moy Ramirez is a 50y.o. year old female. She presented to the hospital with a chief complaint of shortness of breath. She also had altered mental status. By the time I came down to see her in the emergency department she was already intubated and sedated on propofol. After reviewing the medical record it reveals that she has a history of valvular heart disease, neuroendocrine tumor with hepatic metastasis status post embolization. She was recently in a hospital in [place] and in the ICU and discharge as recently as last week. According to the emergency department she was hypoxic on presentation on a nonrebreather. She was hypoglycemic. She is on octreotide injections at home. She was previously seen by palliative care at [facility]. It is unclear if there are any exacerbating or relieving factors to her symptoms. Symptoms seem severe in nature. There were no family or friends present.  called at 44.  stated that patient was recently discharged from [facility] after having an GEMINI. At that time patient received a paracentesis that pulled off 3 L of fluid. Stated that patient went home but still was not feeling well. Patient had her furosemide increased. Notes that patient progressively got worse over the past week. Notes that patient's main complaint was that she was complaining of abdominal pain where the paracentesis was performed. States that her abdomen is usually mottled and amy in color. Denies any new drainage. States that patient was not having any fevers but upon arrival to the ED patient was hypothermic.   Patient was requiring opioid pain medication while at home for her abdominal pain with a paracentesis was performed. She continued to deteriorate to the point where she was not speaking to the  yesterday. His mobility was severely compromised and she would not walk around which resulted  bring her in. Expressed frustration with not being able to come in and see patient. ED course:   Initial blood work and imaging studies performed. Admission recommended by ED physician. Case discussed with ED provider. Meds in ED consisted of the following:  Medications   glucose (GLUTOSE) 40 % oral gel (has no administration in time range)   sodium chloride flush 0.9 % injection 10 mL (has no administration in time range)   dextrose 50 % IV solution (has no administration in time range)   dextrose 5 % in lactated ringers infusion (1,000 mLs Intravenous New Bag 12/16/20 1442)   etomidate (AMIDATE) 2 MG/ML injection (has no administration in time range)   rocuronium (ZEMURON) 50 MG/5ML injection (has no administration in time range)   0.9 % sodium chloride bolus (1,998 mLs Intravenous New Bag 12/16/20 1401)   dextrose 50 % IV solution (25 g Intravenous Given 12/16/20 1242)   dextrose 50 % IV solution (25 g Intravenous Given 12/16/20 1437)       Past Medical History:   Diagnosis Date    Anxiety     Depression     Metastatic carcinoid tumor ([facility] [address] 9/15/2019    Obesity        No past surgical history on file. -- unable to be obtained     [facility]  No pertinent family medical history with regard to current issues. Social History  Patient lives at home with her   TOBACCO:   reports that she quit smoking about 16 months ago. Her smoking use included cigarettes. She has a 16.00 pack-year smoking history. She has never used smokeless tobacco.  ETOH:   reports no history of alcohol use. Home Medications  Prior to Admission medications    Medication Sig Start Date End Date Taking?  Authorizing Provider   furosemide (LASIX) 40 MG tablet Take 40 mg by mouth 2 times daily 7/21/20   Historical Provider, MD   ibuprofen (ADVIL;MOTRIN) 600 MG tablet Take 600 mg by mouth every 6 hours as needed    Historical Provider, MD   potassium chloride (KLOR-CON M) 20 MEQ extended release tablet Take 60 meq daily 8/14/20 9/14/20  Historical Provider, MD   FLUoxetine (PROZAC) 40 MG capsule Take 40 mg by mouth daily    Historical Provider, MD   ondansetron (ZOFRAN) 8 MG tablet Take 8 mg by mouth every 8 hours as needed for Nausea or Vomiting Take 1 tablet every 8 hrs prn    Historical Provider, MD   Octreotide Acetate (SANDOSTATIN) 200 MCG/ML SOLN Inject 200 mcg into the skin every 30 days     Historical Provider, MD   cyproheptadine (PERIACTIN) 4 MG tablet Take 4 mg by mouth 3 times daily    Historical Provider, MD   lanreotide acetate (SOMATULINE DEPOT) 120 MG/0.5ML SOLN depot injection Inject 120 mg into the skin once    Historical Provider, MD       Allergies  Allergies   Allergen Reactions    Seasonal        Review of Systems  Unable to be obtained    Objective  VITALS:  BP (!) 138/59   Pulse 87   Temp 93.9 °F (34.4 °C)   Resp 18   Ht 5' 7\" (1.702 m)   Wt 146 lb 14.4 oz (66.6 kg)   SpO2 (!) 88%   BMI 23.01 kg/m²     Physical Exam:  General: Intubated, sedated, on the ventilator  Eyes: conjunctivae/corneas clear, sclera non icteric  Ears: no obvious scars, no lesions, no masses  Mouth: mucous membranes moist, no obvious oral sores  Head: normocephalic, atraumatic  Neck: no JVD, no adenopathy, no thyromegaly, neck is supple, trachea is midlin  Chest: no pain on palpation  Lungs: Diminished breath sounds bilaterally  Heart: regular rate and regular rhythm, no murmur, normal S1, S2  Abdomen:  Mottling of the abdominal skin, soft, non-tender; bowel sounds normal; no masses, no organomegaly  : Deferred   Extremities: no lower extremity edema, extremities atraumatic, no cyanosis, no clubbing, 2+ pedal pulses palpated  Skin: normal color, normal texture, normal turgor, no rashes, no lesions  Neurologic: normal muscle tone throughout, face symmetric, hearing intact, tongue midline, speech appropriate without slurring, sensation to fine touch intact in upper and lower extremities    Labs-   Lab Results   Component Value Date    WBC 17.8 (H) 12/16/2020    HGB 13.2 12/16/2020    HCT 39.3 12/16/2020     12/16/2020     (L) 12/16/2020    K 5.3 (H) 12/16/2020    CL 97 (L) 12/16/2020    CREATININE 2.6 (H) 12/16/2020    BUN 48 (H) 12/16/2020    CO2 17 (L) 12/16/2020    GLUCOSE 123 (H) 12/16/2020    ALT 50 (H) 12/16/2020     (H) 12/16/2020    INR 2.4 12/16/2020     Lab Results   Component Value Date    CKTOTAL 2,770 (H) 12/16/2020    TROPONINI <0.01 12/16/2020       Recent Radiological Studies:  XR CHEST PORTABLE   Final Result   1. Endotracheal tube is approximately 1 cm above the sandra. This tube   could be retracted approximately 2 cm. 2.  Diffuse airspace opacities throughout both lungs suggesting interstitial   pulmonary edema or pneumonia with likely right pleural effusion. XR ABDOMEN FOR NG/OG/NE TUBE PLACEMENT   Final Result   The tip of the nasogastric tube overlies the stomach. CT Head WO Contrast   Final Result   No significant abnormal findings. Moderate image degradation from motion. CT CHEST WO CONTRAST   Final Result   1. Very large bilateral pleural effusions, right greater than left. The   right pleural effusion occupies at least 70-75% of the right hemithorax. 2. Multifocal bilateral ground-glass and semi solid pulmonary infiltrates   most consistent with COVID-19 pneumonia   3. Compression atelectasis of the right lower lobe with collapse of the right   lower lobe. 4. Extensive metastatic disease to liver. There is virtually no residual   normal liver parenchyma. CT ABDOMEN PELVIS WO CONTRAST Additional Contrast? None   Final Result   Progression of extensive metastases to the l enlarged liver. Interval development of ascites.    New large bilateral pleural effusion and bibasilar lung infiltrates. Extensive subcutaneous edema. XR CHEST PORTABLE   Final Result   Moderate right-sided pleural effusion and small left-sided pleural effusion   with compressive atelectasis. XR CHEST PORTABLE    (Results Pending)       Assessment  Active Hospital Problems    Diagnosis    Acute respiratory failure with hypoxia (HCC) [J96.01]     Priority: High    GEMINI (acute kidney injury) ([facility] [address] [N17.9]     Priority: Medium    Metastatic carcinoid tumor ([facility] [address] [C7B.00]     Priority: Medium    Depression [F32.9]    Anxiety [F41.9]    Current moderate episode of major depressive disorder without prior episode ([address] [F32.1]       Patient Active Problem List    Diagnosis Date Noted    Acute respiratory failure with hypoxia ([facility] [address] 12/16/2020     Priority: High    GEMINI (acute kidney injury) ([facility] [address] 12/16/2020     Priority: Medium    Metastatic carcinoid tumor ([facility] [address] 09/15/2019     Priority: Medium    Depression     Anxiety 06/10/2019    Current moderate episode of major depressive disorder without prior episode ([facility] [address] 06/10/2019       Plan   · Acute hypoxic respiratory failure, multifactorial nature: ICU monitoring. Critical care following. Mechanical ventilation. · Pleural effusion: Thoracentesis per pulmonology  · Neuroendocrine tumor with liver metastasis: Oncology consultation. Obtain medical records from [facility]. Palliative care consultation--reportedly patient wants to still be a full code. Follow hepatic function panel. · GEMINI, hyperkalemia: Nephrology consultation. IVF hydration. Follow lactic acid. · Hypoglycemia: IVF hydration with dextrose. Hypoglycemia protocol. · Sepsis of unclear etiology-possible SBP: ID consultation. Cultures. Follow CBC. Pressors as needed. IVF hydration. · Metabolic encephalopathy: Possible hepatic encephalopathy: Follow ammonia level. Start Xifaxan via NG tube. · Continue home medications  · Follow labs  · DVT prophylaxis.   · Please see orders for further management and care. ·  for discharge planning  · Discharge plan: TBD pending clinical improvement     Dami Macias DO  12/16/2020  5:32 PM    I can be reached through [email or web address]. NOTE:  This report was transcribed using voice recognition software. Every effort was made to ensure accuracy; however, inadvertent computerized transcription errors may be present.

## 2020-12-16 NOTE — CONSULTS
Critical Care Admit/Consult Note         Patient Abi Gonzales   MRN -  [id number]   [facility]   - 1975      Date of Admission -  2020 12:27 PM  Date of evaluation -  2020   Hospital Day - 0            ADMIT/CONSULT DETAILS     Reason for Admit/Consult   Acute respiratory failure with hypoxia, septic shock    Consulting Service/Physician   Consulting - Sarah Hansen DO  Primary Care Physician - Mary Hong MD         HPI   The patient is a 50 y.o. female with significant past medical history of valvular cardiomyopathy, neuroendocrine tumor with extensive hepatic metastasis status post embolization procedure, anxiety recent ICU admission at [address] [place] [address], who presented to the emergency department 2020 with increased shortness of breath weakness and altered mental status. History was limited secondary to patient's altered mental status. She was found to be hypoxic initially placed on nonrebreather. She was hypoglycemic and given D50 as well as D10. CT scan showed extensive bilateral pleural effusions. Her respiratory status declined and she was then intubated in the emergency department. After fluid resuscitation she continued to be hypotensive and she was then started on Levophed. A arterial line and central line were placed in her right femoral prior to arrival in the ICU by the ED resident and attending physician. Other lab work in the ED notable for GEMINI with creatinine 2.6. Total CK of 2700 concerning for rhabdomyolysis. Potassium 5.3, mild hyperkalemia. Lactic acidosis with a lactic acid of 5.3. Her LFTs were elevated as was her ammonia 74. INR moderately elevated 2.4. Apparently previously she been giving herself octreotide injections at home. She had previously seen palliative care at [address] [place] [address]. Cancer seems terminal.  She had previously been admitted to critical care at [address] [place] [address] only about 1 week ago.          Past Medical History         Diagnosis Date    Anxiety     Depression     Metastatic carcinoid tumor ([facility] [address] 9/15/2019    Obesity         Past Surgical History     No past surgical history on file. SOCIAL AND OCCUPATIONAL HEALTH: Former smoker smoking 1 pack a day for 16 years. Does not use alcohol. Lives at home. Influenza: Not up-to-date   pneumococcal Polysaccharide: Not up-to-date                Current Medications   Current Medications    glucose         sodium chloride flush, dextrose  IV Drips/Infusions   dextrose 5% in lactated ringers 1,000 mL (20 1442)    norepinephrine       Home Medications  Not in a hospital admission. Diet/Nutrition   No diet orders on file    Allergies   Seasonal    Social History   Tobacco   reports that she quit smoking about 16 months ago. Her smoking use included cigarettes. She has a 16.00 pack-year smoking history. She has never used smokeless tobacco.    Alcohol     reports no history of alcohol use. Occupational history :    Family History   No family history on file. Sleep History   n/a    ROS     REVIEW OF SYSTEMS:  Limited secondary to altered mental status    Lines and Devices    R femoral central line and arterial line placed 2020    Mechanical Ventilation Data   VENT SETTINGS (Comprehensive)  Vent Information  SpO2: (!) 88 %  Additional Respiratory  Assessments  Pulse: 83  Resp: 16  SpO2: (!) 88 %    ABG  Lab Results   Component Value Date    PH 7.313 2020    PCO2 28.9 2020    PO2 66.1 2020    HCO3 14.3 2020    O2SAT 90.7 2020     No results found for: IFIO2, MODE, SETTIDVOL, SETPEEP        Vitals    height is 5' 7\" (1.702 m) and weight is 146 lb 14.4 oz (66.6 kg). Her temperature is 93.9 °F (34.4 °C). Her blood pressure is 123/55 (abnormal) and her pulse is 83. Her respiration is 16 and oxygen saturation is 88% (abnormal).        Temperature Range: Temp: 93.9 °F (34.4 °C) Temp  Av.5 °F (34.7 °C)  Min: 93.9 °F (34.4 °C)  Max: 95.7 °F (35.4 °C)  BP Range:  Systolic (98RHK), UES:422 , Min:87 , YK     Diastolic (78XJA), LNI:41, Min:41, Max:61    Pulse Range: Pulse  Av.8  Min: 81  Max: 90  Respiration Range: Resp  Av.8  Min: 16  Max: 24  Current Pulse Ox[de-identified]  SpO2: (!) 88 %  24HR Pulse Ox Range:  SpO2  Av.5 %  Min: 80 %  Max: 94 %  Oxygen Amount and Delivery: O2 Flow Rate (L/min): 12 L/min      I/O (24 Hours)    Patient Vitals for the past 8 hrs:   BP Temp Temp src Pulse Resp SpO2 Height Weight   20 1549 (!) 123/55 93.9 °F (34.4 °C) -- 83 16 (!) 88 % -- --   20 1536 (!) 87/41 -- -- 81 -- 92 % -- --   20 1524 138/61 -- -- 89 22 (!) 87 % -- --   20 1406 (!) 118/49 95.7 °F (35.4 °C) -- 83 24 94 % -- --   20 1325 -- -- -- -- -- -- 5' 7\" (1.702 m) 146 lb 14.4 oz (66.6 kg)   20 1259 -- 94 °F (34.4 °C) Bladder 89 22 (!) 80 % -- --   20 1249 -- 94.3 °F (34.6 °C) Bladder 90 20 90 % -- --     No intake or output data in the 24 hours ending 20 1556  No intake/output data recorded.      Patient Vitals for the past 96 hrs (Last 3 readings):   Weight   20 1325 146 lb 14.4 oz (66.6 kg)         Drains/Tubes Outputs  Parra catheter in place  Exam         PHYSICAL EXAM:    General appearance -sedated, intubated, acutely ill-appearing  Mental status -sedated, intubated  Eyes - pupils equal and reactive, extraocular eye movements intact, sclera anicteric  Ears - external ear normal  Nose - normal and patent, no erythema, discharge or polyps  Mouth - mucous membranes moist, pharynx normal without lesions  Neck - supple, no significant adenopathy  Chest -managed breath sounds bilaterally, no wheezes, rales or rhonchi, symmetric air entry  Heart - normal rate, regular rhythm, normal S1, S2, no murmurs, rubs, clicks or gallops  Abdomen - soft, nontender, mildly distended abdomen, no masses or organomegaly  Neurological -sedated, intubated, pupils equal round reactive to light  extremities -extremities somewhat cool with poor cap refill. Skin - normal coloration and turgor, no rashes, no suspicious skin lesions noted     Data   Old records and images have been reviewed    Lab Results   CBC     Lab Results   Component Value Date    WBC 17.8 12/16/2020    RBC 4.51 12/16/2020    HGB 13.2 12/16/2020    HCT 39.3 12/16/2020     12/16/2020    MCV 87.1 12/16/2020    MCH 29.3 12/16/2020    MCHC 33.6 12/16/2020    RDW 18.2 12/16/2020    NRBC 0.9 01/20/2020    BLASTSPCT 2.6 01/14/2020    METASPCT 2.6 03/09/2020    LYMPHOPCT 11.1 12/16/2020    MONOPCT 2.9 12/16/2020    MYELOPCT 0.9 03/09/2020    EOSPCT 1.0 10/29/2019    BASOPCT 0.2 12/16/2020    MONOSABS 0.51 12/16/2020    LYMPHSABS 1.97 12/16/2020    EOSABS 0.02 12/16/2020    BASOSABS 0.03 12/16/2020       BMP   Lab Results   Component Value Date     12/16/2020    K 5.3 12/16/2020    K 3.6 01/14/2020    CL 97 12/16/2020    CO2 17 12/16/2020    BUN 48 12/16/2020    CREATININE 2.6 12/16/2020    GLUCOSE 123 12/16/2020    CALCIUM 9.0 12/16/2020       LFTS  Lab Results   Component Value Date    ALKPHOS 750 12/16/2020    ALT 50 12/16/2020     12/16/2020    PROT 6.5 12/16/2020    BILITOT 1.4 12/16/2020    BILIDIR 1.1 12/16/2020    IBILI 0.3 12/16/2020    LABALBU 1.9 12/16/2020       INR  Recent Labs     12/16/20  1328   PROTIME 28.5*   INR 2.4       APTT  Recent Labs     12/16/20  1328   APTT 43.1*       Lactic Acid  Lab Results   Component Value Date    LACTA 5.3 12/16/2020    LACTA 1.8 01/14/2020    LACTA 1.9 10/10/2019        BNP   No results for input(s): BNP in the last 72 hours. Cultures     No results for input(s): BC in the last 72 hours. No results for input(s): Jesi Canter in the last 72 hours. No results for input(s): LABURIN in the last 72 hours.           Radiology   Ct Abdomen Pelvis Wo Contrast Additional Contrast? None    Result Date: 12/16/2020  EXAMINATION: CT OF THE ABDOMEN AND PELVIS WITHOUT CONTRAST 12/16/2020 2:11 pm TECHNIQUE: CT of the abdomen and pelvis was performed without the administration of intravenous contrast. Multiplanar reformatted images are provided for review. Dose modulation, iterative reconstruction, and/or weight based adjustment of the mA/kV was utilized to reduce the radiation dose to as low as reasonably achievable. COMPARISON: October 10, 2019 HISTORY: ORDERING SYSTEM PROVIDED HISTORY: altered, TECHNOLOGIST PROVIDED HISTORY: Reason for exam:->altered, Additional Contrast?->None FINDINGS: Lower Chest: Large bilateral pleural effusion. Patchy infiltrates. Organs: Liver is enlarged with diffusely heterogeneous scattered masses. There is radiopaque density in the gravity dependent portion of the gallbladder, which may represent vicarious excretion of contrast.  The kidneys are normal in size and shape with no calcified stones or hydronephrosis. Spleen is not enlarged. The adrenal glands and pancreas are poorly defined on this non IV contrast study. GI/Bowel: No obstructing or constricting mass lesions. Pelvis: Free fluid. Bladder is collapsed with Parra catheter in place. The rectum is distended. Peritoneum/Retroperitoneum: Mild ascites. No large pathologically enlarged lymph nodes. Bones/Soft Tissues: No large lytic or blastic bony lesions. Diffuse subcutaneous edema. Progression of extensive metastases to the l enlarged liver. Interval development of ascites. New large bilateral pleural effusion and bibasilar lung infiltrates. Extensive subcutaneous edema. Ct Head Wo Contrast    Result Date: 12/16/2020  EXAMINATION: CT OF THE HEAD WITHOUT CONTRAST  12/16/2020 1:11 pm TECHNIQUE: CT of the head was performed without the administration of intravenous contrast. Dose modulation, iterative reconstruction, and/or weight based adjustment of the mA/kV was utilized to reduce the radiation dose to as low as reasonably achievable. COMPARISON: None.  HISTORY: ORDERING SYSTEM PROVIDED HISTORY: [facility] TECHNOLOGIST PROVIDED HISTORY: Reason for exam:->AMS Has a \"code stroke\" or \"stroke alert\" been called? ->No FINDINGS: The study is degraded moderately by motion artifact. No hemorrhage, mass or midline shift are demonstrated. Normal ventricles and sulci. Normal bony calvarium. No significant abnormal findings. Moderate image degradation from motion. Ct Chest Wo Contrast    Result Date: 12/16/2020  EXAMINATION: CT OF THE CHEST WITHOUT CONTRAST 12/16/2020 2:11 pm TECHNIQUE: CT of the chest was performed without the administration of intravenous contrast. Multiplanar reformatted images are provided for review. Dose modulation, iterative reconstruction, and/or weight based adjustment of the mA/kV was utilized to reduce the radiation dose to as low as reasonably achievable. COMPARISON: Previous CT scan of the abdomen of 10/10/2019 HISTORY: ORDERING SYSTEM PROVIDED HISTORY: altered, hypoxic TECHNOLOGIST PROVIDED HISTORY: Reason for exam:->altered, hypoxic FINDINGS: Mediastinum: The heart is normal in size. The thoracic aorta is normal caliber. Gross mediastinal lymphadenopathy is not appreciated. Lungs/pleura: There are very large bilateral pleural effusions, right greater than left. There is complete collapse of the right lower lobe. Multifocal bilateral ground-glass and semi solid infiltrates are noted consistent with COVID pneumonia. Upper Abdomen: The abdomen will be dictated separately. There is extensive metastatic disease throughout the liver and it is doubtful that there is any normal residual liver parenchyma. Soft Tissues/Bones: Gross metastatic disease to the thoracic spine is not appreciated. There is no compression fracture. 1. Very large bilateral pleural effusions, right greater than left. The right pleural effusion occupies at least 70-75% of the right hemithorax. 2. Multifocal bilateral ground-glass and semi solid pulmonary infiltrates most consistent with COVID-19 pneumonia 3.  Compression atelectasis of the right lower lobe with collapse of the right lower lobe. 4. Extensive metastatic disease to liver. There is virtually no residual normal liver parenchyma. Xr Chest Portable    Result Date: 12/16/2020  EXAMINATION: ONE XRAY VIEW OF THE CHEST 12/16/2020 1:21 pm COMPARISON: Chest radiograph January 14, 2020 HISTORY: ORDERING SYSTEM PROVIDED HISTORY: altered mental, concern for sepsis TECHNOLOGIST PROVIDED HISTORY: Reason for exam:->altered mental, concern for sepsis FINDINGS: Trachea is midline. Cardiomediastinal silhouette is stable in size. There are bilateral pleural effusions measuring moderate in the right and small on the left. No pneumothorax appreciated. Moderate right-sided pleural effusion and small left-sided pleural effusion with compressive atelectasis. Active Problems:  1. Acute respiratory failure with hypoxia requiring mechanical ventilation  2. Septic shock  3. Extensive bilateral pleural effusions  4. Metastatic carcinoid cancer  5. Hypoglycemia  6. Lactic acidosis  7. Rhabdomyolysis   8. GEMINI  9. Hyperkalemia    SYSTEMS ASSESSMENT    Neuro   Intubated, sedated  Altered prior to arrival-likely secondary to hypoglycemia and acute respiratory status  CT without contrast in the ED showed no acute intercranial abnormality    Respiratory   Acute respiratory failure with hypoxia  Intubated on mechanical ventilation  Wean FiO2 as tolerated  Extensive bilateral pleural effusions   -Likely will need chest tube drainage  Respiratory panel pending  Trend ABG  Wean oxygen as tolerated.  Keep O2 sat 90-92%    Cardiovascular   Septic shock  History of underlying valvular disease concern for fluid overload  Levophed  Maintain MAP greater than 65    Gastrointestinal   Extensive carcinoid cancer felt to be primary source in the small bowel  Cancer just diagnosed earlier last year but metastatic and likely terminal    Renal   GEMINI on CKD with baseline creatinine from [facility] of 1.6 presenting with creatinine of 2.6  Concern for developing rhabdomyolysis with CK of 2700  Mild hyperkalemia potassium of 5.3  Trend metabolic panel every 6 hours     Infectious Disease   Septic shock  Possible pneumonia  Respiratory panel pending  Started on vancomycin and cefepime in the emergency department  Blood cultures pending    Hematology/Oncology   Leukocytosis  Trend H&H  Daily CBC  Supratherapeutic INR not on anticoagulation    Endocrine   Hypoglycemia  Monitor blood sugar closely    Social/Spiritual/DNR/Other   Full code. Likely will need palliative consultation    ASSESSMENT/ PLAN   1. Acute respiratory failure with hypoxia, wean ventilator settings as tolerated  2. Extensive bilateral pleural effusions requiring drainage chest tube  3. Repeat labs monitor renal function, CK  4. Palliative consultation at some point during ICU stay    Alejandra Silveira DO, PGY2  3:56 PM 12/16/20        I personally saw, examined and provided care for the patient. Radiographs, labs and medication list were reviewed by me independently. I spoke with bedside nursing, therapists and consultants. Critical care services and times documented are independent of procedures and multidisciplinary rounds with Residents. Additionally comprehensive, multidisciplinary rounds were conducted with the MICU team. The case was discussed in detail and plans for care were established. Review of Residents documentation was conducted and revisions were made as appropriate. I agree with the above documented exam, problem list and plan of care with the following additions:    Ms. Ashley Workman is an unfortunate 50year old female with a history of metastatic carcinoid tumor originating in the small bowel with extensive hepatic mets s/p TAE who presented to the ED today with lethargy, shortness of breath and failure to thrive. She was recently discharged from [facility] after being treated for GEMINI.   It was determined that her performance status was not adequate enough for another round of TAJEROME.  Within the ED she was unstable, hypotensive, hypoglycemic, hypoxic and ultimately required intubation. Her problem list is as follows:    1. Acute hypoxic respiratory failure  2. Large bilateral pleural effusions R>L  3. Adenovirus pneumonia, possible superimposed bacterial pneumonia. COVID-19 NEGATIVE  4. Hypoglycemia  5. Acute kidney injury  6. Hyperglycemia  7. Metabolic acidosis d/t lactic acidosis  8. Elevated CK/rhabdomyolysis  9. Acute on chronic liver failure d/t massive hepatic metastasis  10. Hypoalbuminemia  11. Metastatic carcinoid tumor  12. Metabolic encephalopathy  13. Elevated INR    Plan:  1. Full vent support, settings adjusted  2. Urgent right chest tube placed to assist with hypoxia and ventilation issues  3. Send pleural fluid studies  4. Vasopressors if needed  5. Sodium bicarb infusion  6. Trend lactic acid  7. Empiric antibiotics/broad cultures  8. May need a paracentesis  9. Nephrology consult  10. Palliative care consult    Prognosis is very poor. Hospice is appropriate. 50 minutes of CCT spent with the patient, reviewing the chart including imaging studies, and discussing the case with other health care professionals. This time excludes procedures.      Electronically signed by Crow Walker MD on 12/16/2020 at 10:24 PM

## 2020-12-16 NOTE — PROCEDURES
Chest Tube Placement Procedure Note    Indication: effusion    Consent: Unable to be obtained due to the emergent nature of this procedure. Pre-Medication: She was on a propofol drip    Procedure: The patient was placed in a semirecumbent position with the head of the bed at 30 degrees. The right side was prepped with betadine and draped in a sterile fashion. Local anesthesia over the insertion site was not performed due to emergent nature of this procedure. A needle was introduced into the mid axillary line 6th intercostal space and a guidewire was then inserted. A pigtail catheter was placed over the guidewire using modified Seldinger technique and connected to a pleurivac at -20 cm H2O. Initial output from the tube was 900 cc of clear and serous fluid. The tube was sutured in place and the site was covered with an occlusive dressing. All connections were banded. Breath sounds after the procedure were improved. A chest x-ray was obtained to evaluate placement which showed good tube position. The patient tolerated the procedure well. Complications: None      Cammy Schlatter, MD PGY-1  12/16/2020 5:48 PM    I was present to provide direct supervision for the above services.     Carla Vázquez MD  12/16/2020 10:16 PM

## 2020-12-17 PROBLEM — F06.4 ANXIETY DISORDER DUE TO MEDICAL CONDITION: Status: ACTIVE | Noted: 2019-06-10

## 2020-12-17 PROBLEM — C7A.8 NEUROENDOCRINE CARCINOMA (HCC): Status: ACTIVE | Noted: 2020-01-01

## 2020-12-17 PROBLEM — J90 PLEURAL EFFUSION, RIGHT: Status: ACTIVE | Noted: 2020-01-01

## 2020-12-17 PROBLEM — E87.20 LACTIC ACIDOSIS: Status: ACTIVE | Noted: 2020-01-01

## 2020-12-17 PROBLEM — R74.8 ELEVATED SERUM ALKALINE PHOSPHATASE LEVEL: Status: ACTIVE | Noted: 2019-11-27

## 2020-12-17 PROBLEM — C78.7 LIVER METASTASIS (HCC): Status: ACTIVE | Noted: 2020-01-01

## 2020-12-17 NOTE — PATIENT CARE CONFERENCE
Intensive Care Daily Quality Rounding Checklist      ICU Team Members: Dr. Pauline Pena, bedside RN, TL    ICU Day #: 2    Intubation Date: 12/16/20    Ventilator Day #: NUMBER: 2    Central Line Insertion Date: 12/16/20        Day #: 2     Arterial Line Insertion Date: 12/16/20      Day #: 2      DVT Prophylaxis: Heparin    GI Prophylaxis:    Parra Catheter Insertion Date: 12/16/20    Day #: 2      Continued need (if yes, reason documented and discussed with physician): yes, continued need for close monitoring of intake and output  Skin Issues/ Wounds and ordered treatment discussed on rounds: No    Goals/ Plans for the Day: HD cath, HD, INR, PT/INR, labs, appreciate palliative and oncology consult, continue critical care management

## 2020-12-17 NOTE — CONSULTS
12/17/2020  9:43 AM      Comprehensive Nutrition Assessment    Type and Reason for Visit:  Initial, Consult(TF RECOMMENDATION)    Nutrition Recommendations/Plan: If EN desired for nutrition support, recommend:    TF RECOMMENDATION: Renal TF (Nepro) to goal rate 35 ml/hr  This will provide: 840 ml/d, 1512 bonita, 68 g pro, 609 ml free water  This regimen will meet 100% calorie and protein needs    Nutrition Assessment:  Pt admit d/t septic shock/GEMINI. Pt has liver mets from carcinoid small bowel tumor, now intubated. Palliative care following and Hospice consult may be pursued, as prognosis is very poor, per Dr. Nimco Cordoba. Will provide TF rec, per consult. Malnutrition Assessment:  Malnutrition Status:   At risk for malnutrition (Comment)    Context:  Chronic Illness     Findings of the 6 clinical characteristics of malnutrition:  Energy Intake:  7 - 75% or less estimated energy requirements for 1 month or longer  Weight Loss:  Unable to assess(unable to accurately assess, as ascites and fluid status may mask actual losses)     Body Fat Loss:  Unable to assess     Muscle Mass Loss:  Unable to assess    Fluid Accumulation:  Unable to assess Ascites, Generalized(multiple factors possible)   Strength:  Not Performed    Estimated Daily Nutrient Needs:  Energy (kcal):  ; Weight Used for Energy Requirements:  Current     Protein (g):  65-80 (1.0-1.2 g/kg) as lane w/GEMINI/high ammonia; Weight Used for Protein Requirements:  Current        Fluid (ml/day):  Per Critical Care or Renal management; Method Used for Fluid Requirements:  Other (Comment)      Nutrition Related Findings:  intubated, propofol sedation off now d/t no neuro need to sedate, OGT to LIS, Ammonia 91.5 (on lactulose) ascites, pressor (MAP 67), CT, hypo BS, +1 gen edema, mottled skin      Wounds:  None       Current Nutrition Therapies:    Diet NPO Effective Now    Anthropometric Measures:  · Height: 5' 7\" (170.2 cm)  · Current Body Weight: 144 lb (65.3 kg)(12/17 bedwt)   · Admission Body Weight: 146 lb (66.2 kg)(12/16 bedwt)    · Usual Body Weight: 156 lb (70.8 kg)(per [facility] EMR x 6 mo)     · Ideal Body Weight: 135 lbs; % Ideal Body Weight 106.7 %   · BMI: 22.5  · BMI Categories: Normal Weight (BMI 18.5-24. 9)       Nutrition Diagnosis:   · Inadequate oral intake related to catabolic illness(Carcinoid tumor w/mets to liver - very poor prognosis) as evidenced by NPO or clear liquid status due to medical condition, intubation      Nutrition Interventions:   Food and/or Nutrient Delivery:  Continue NPO(Will provide TF rec, per consult)  Nutrition Education/Counseling:  Education not indicated   Coordination of Nutrition Care:  Continue to monitor while inpatient    Goals:  Nutrition progression as approp w/POC       Nutrition Monitoring and Evaluation:   Behavioral-Environmental Outcomes:  None Identified   Food/Nutrient Intake Outcomes:  Diet Advancement/Tolerance  Physical Signs/Symptoms Outcomes:  Biochemical Data, GI Status, Fluid Status or Edema, Hemodynamic Status, Weight, Skin, Nutrition Focused Physical Findings     Discharge Planning:     Too soon to determine     Electronically signed by Lexa Das RD, CNSC, LD on 12/17/20 at 9:43 AM EST    Contact: [phone]

## 2020-12-17 NOTE — CONSULTS
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 She was intubated there for hypoxic resp failure. She had a pigtail placed yesterday and 900 ml was drained. She has been hypotensive and is on levo. Her labs show na 132 k 5.5 co2 13 bun 47 cr 2.8, mg 2.2, ca 7.9, p 7.2, ck 3800, alb 1.7, nh4 56, ast 1835 alt 56 bilirubin total 1.7, wbc 24.8, hgb 13.9, plt 284. There is no hydro on ct abd/pelvis. She has been oliguric. hitsory is obtained from the chart. Cr from 3/2020 was 0.7 and from [facility] recently it was 1.6. she has been started empirically on vanco and cefepime and a bicarb drip. outpatient lasix and advil are on hold.      PMH:    Past Medical History:   Diagnosis Date    Anxiety     Depression     Liver metastasis ([facility] [address] 12/17/2020    Metastatic carcinoid tumor ([facility] [address] 9/15/2019    Obesity        Patient Active Problem List   Diagnosis    Anxiety    Current moderate episode of major depressive disorder without prior episode ([address]    Metastatic carcinoid tumor ([address]    Acute respiratory failure with hypoxia (HCC)    Depression    GEMINI (acute kidney injury) ([address]    Liver metastasis (HCC)    Anxiety disorder due to medical condition    Pleural effusion, right    Neuroendocrine carcinoma (HCC)    Elevated serum alkaline phosphatase level    Lactic acidosis       Meds:     sodium zirconium cyclosilicate  10 g Oral Once    pantoprazole  40 mg Intravenous Daily    And    sodium chloride (PF)  10 mL Intravenous Daily    sodium chloride flush  10 mL Intravenous 2 times per day    heparin (porcine)  5,000 Units Subcutaneous 3 times per day    rifaximin  550 mg Per NG tube BID    cefepime  2 g Intravenous Q24H    lactulose  20 g Oral TID    cyproheptadine  4 mg Oral TID    vancomycin (VANCOCIN) intermittent dosing (placeholder)   Other RX Placeholder    sodium zirconium cyclosilicate  5 g Oral TID        norepinephrine 6 mcg/min (12/17/20 0000)    dextrose      sodium bicarbonate infusion 100 mL/hr at 12/17/20 0250    propofol Stopped (12/17/20 0857)    sodium chloride         Meds prn:     sodium chloride flush, glucose, dextrose, glucagon (rDNA), glucose, glucagon (rDNA), dextrose, promethazine **OR** ondansetron, acetaminophen **OR** acetaminophen, bisacodyl, fentanNYL    Meds prior to admission:     No current facility-administered medications on file prior to encounter. Current Outpatient Medications on File Prior to Encounter   Medication Sig Dispense Refill    furosemide (LASIX) 40 MG tablet Take 40 mg by mouth 2 times daily      ibuprofen (ADVIL;MOTRIN) 600 MG tablet Take 600 mg by mouth every 6 hours as needed      potassium chloride (KLOR-CON M) 20 MEQ extended release tablet Take 60 meq daily      FLUoxetine (PROZAC) 40 MG capsule Take 40 mg by mouth daily      ondansetron (ZOFRAN) 8 MG tablet Take 8 mg by mouth every 8 hours as needed for Nausea or Vomiting Take 1 tablet every 8 hrs prn      Octreotide Acetate (SANDOSTATIN) 200 MCG/ML SOLN Inject 200 mcg into the skin every 30 days       cyproheptadine (PERIACTIN) 4 MG tablet Take 4 mg by mouth 3 times daily      lanreotide acetate (SOMATULINE DEPOT) 120 MG/0.5ML SOLN depot injection Inject 120 mg into the skin once         Allergies:    Seasonal    Social History:     reports that she quit smoking about 16 months ago. Her smoking use included cigarettes. She has a 16.00 pack-year smoking history. She has never used smokeless tobacco. She reports that she does not drink alcohol. Family History:     No family history on file.     ROS:     General: no fever, chills   Heent: no nasal congestion, sore throat   Resp: no cough, sob , hemoptysis  Cardiac: no cp , le edema, palpitations  Gi: no nausea, vomiting, melena, abd pain, hematemesis  Gu: no hematuria, dysuria   Neruo: no numbness, weakness, headache, blurry vision   Endocrine:  no h/o dm  Derm: no rash , petechia  Heme: no epistaxis, bruising  All other sx negative     Physical Exam:      Patient Vitals for the past 24 hrs:   BP Temp Temp src Pulse Resp SpO2 Height Weight   12/17/20 0928 -- -- -- -- -- -- 5' 7\" (1.702 m) --   12/17/20 0800 -- -- -- 92 22 97 % -- --   12/17/20 0700 -- -- -- 93 22 96 % -- --   12/17/20 0600 -- -- -- 94 22 96 % -- 144 lb 10 oz (65.6 kg)   12/17/20 0500 -- -- -- 96 25 96 % -- --   12/17/20 0400 (!) 100/51 98.8 °F (37.1 °C) Bladder 96 26 96 % -- --   12/17/20 0326 -- -- -- 95 23 97 % -- --   12/17/20 0300 -- -- -- 95 25 97 % -- --   12/17/20 0200 -- -- -- 95 26 96 % -- --   12/17/20 0100 -- -- -- 93 25 95 % -- --   12/17/20 0011 -- -- -- 91 24 97 % -- --   12/17/20 0000 -- -- -- 91 24 97 % -- --   12/16/20 2300 -- -- -- 90 22 98 % -- --   12/16/20 2200 -- -- -- 86 22 99 % -- --   12/16/20 2134 -- -- -- 85 22 99 % -- --   12/16/20 2100 -- -- -- 84 22 98 % -- --   12/16/20 2006 -- -- -- 81 22 100 % -- --   12/16/20 2000 (!) 104/48 97.2 °F (36.2 °C) Bladder 81 22 100 % -- --   12/16/20 1957 -- -- -- 81 22 100 % -- --   12/16/20 1900 -- -- -- 79 22 100 % -- --   12/16/20 1800 -- -- -- 79 22 100 % -- --   12/16/20 1751 -- 93.9 °F (34.4 °C) Bladder -- -- -- -- --   12/16/20 1737 -- -- -- 80 23 -- -- --   12/16/20 1559 (!) 138/59 93.9 °F (34.4 °C) -- 87 18 (!) 88 % -- --   12/16/20 1556 -- -- -- -- -- (!) 88 % -- --   12/16/20 1549 (!) 123/55 93.9 °F (34.4 °C) -- 83 16 (!) 88 % -- --   12/16/20 1545 -- -- -- -- -- (!) 88 % -- --   12/16/20 1536 (!) 87/41 -- -- 81 -- 92 % -- --   12/16/20 1524 138/61 -- -- 89 22 (!) 87 % -- --   12/16/20 1406 (!) 118/49 95.7 °F (35.4 °C) -- 83 24 94 % -- --   12/16/20 1325 -- -- -- -- -- -- 5' 7\" (1.702 m) 146 lb 14.4 oz (66.6 kg)   12/16/20 1259 -- 94 °F (34.4 °C) Bladder 89 22 (!) 80 % -- --   12/16/20 1249 -- 94.3 °F (34.6 °C) Bladder 90 20 90 % -- --         Intake/Output Summary (Last 24 hours) at 12/17/2020 0949  Last data filed at 12/17/2020 0800  Gross per 24 hour   Intake 2850 ml   Output 2345 ml   Net 505 ml       Constitutional: Patient in no acute distress   Head: normocephalic, atraumatic   Neck: supple, no jvd  Cardiovascular: regular rate and rhythm, no murmurs, gallops, or rubs   Respiratory: Clear, no rales, rhochi, or wheezes,   Gastrointestinal: soft, nontender, nondistended, no hepatosplenomegaly  Ext:   Neuro:  Skin: dry, no rash   Back: nontender    Data:    Recent Labs     12/16/20  1328 12/17/20  0540   WBC 17.8* 24.6*   HGB 13.2 13.9   HCT 39.3 39.7   MCV 87.1 84.5    284       Recent Labs     12/16/20  1328 12/16/20 2050 12/17/20  0140 12/17/20  0540   * 131* 130* 132   K 5.3* 5.4* 5.7* 5.5*   CL 97* 102 99 98   CO2 17* 13* 11* 13*   CREATININE 2.6* 2.4* 2.5* 2.8*   BUN 48* 46* 46* 47*   LABGLOM 20 22 21 18   GLUCOSE 123* 225* 205* 177*   CALCIUM 9.0 8.1* 8.0* 7.9*   PHOS  --   --   --  7.2*   MG 2.5  --   --  2.2       No results found for: VITD25    No results found for: PTH    Recent Labs     12/16/20  1328 12/17/20  0540   ALT 50* 56*   * 1,035*   ALKPHOS 750* 774*   BILITOT 1.4* 1.7*   BILIDIR 1.1*  --        Recent Labs     12/16/20 1328 12/17/20  0540   LABALBU 1.9* 1.7*       No results found for: FERRITIN, IRON, TIBC    No results found for: [id number]    No results found for: FOLATE      Lab Results   Component Value Date    COLORU Yellow 12/16/2020    NITRU Negative 12/16/2020    GLUCOSEU Negative 12/16/2020    KETUA Negative 12/16/2020    UROBILINOGEN 0.2 12/16/2020    BILIRUBINUR SMALL 12/16/2020       No results found for: TALHA, CREURRAN, MACREATRATIO, OSMOU    No components found for: URIC    No results found for: LIPIDPAN      Assessment and Plan:    1. arf  Baseline 0.7 from 3/2020, 1.6 dec 2020 at [facility]  In setting of hypotension likely septic shock  Component of rhabdo as well but not high enough to cause ATN  ? HRS  Will start hd for oligiria, acidosis, hyperkalemia, vol overload  Check urine lytes  Holding lasix and advil    2. Metastatic small bowel carcinoid tumor to liver  H/o embolization  Elevated lfts , nh4, bilirubin  Per  Gi    3. resp failure  Sp pigtail insertion with 900 ml out  Ascites and b pleural effusions  uf with hd if bp allows  Empiric abx coverage  Culture fluids    4. Metabolic acidosis  In setting of hypotension, renal failure, liver  Failure, likely sepsis  Lactate elevated  On hco3 drip  Awaits hd  Empiric abx  Requiring levo    5. Hyperkalemia  In setting of arf and acidosis  Awaits hd  hco3 drip    6. hyperphoshatemia  Awaits hd      Kim Vazquez.  Lupe Silvestre MD

## 2020-12-17 NOTE — PROGRESS NOTES
Critical Care Team - Daily Progress Note      Date and time: 12/17/2020 1:37 PM  Patient's name:  Elaine Miller  Medical Record Number: [id number]  Patient's account/billing number: [de-identified]  Patient's YOB: 1975  Age: 50 y.o. Date of Admission: 12/16/2020 12:27 PM  Length of stay during current admission: 1      Primary Care Physician: Kasia Dinero MD  ICU Attending Physician: Dr. Ronald Anne Status: Full Code    Reason for ICU admission: Acute respiratory failure with hypoxia, septic shock      SUBJECTIVE:   The patient is a 50 y.o. female with significant past medical history of valvular cardiomyopathy, neuroendocrine tumor with extensive hepatic metastasis status post embolization procedure, anxiety recent ICU admission at [facility], who presented to the emergency department December 16, 2020 with increased shortness of breath weakness and altered mental status. History was limited secondary to patient's altered mental status. She was found to be hypoxic initially placed on nonrebreather. She was hypoglycemic and given D50 as well as D10. CT scan showed extensive bilateral pleural effusions. Her respiratory status declined and she was then intubated in the emergency department. After fluid resuscitation she continued to be hypotensive and she was then started on Levophed. A arterial line and central line were placed in her right femoral prior to arrival in the ICU by the ED resident and attending physician. Other lab work in the ED notable for GEMINI with creatinine 2.6. Total CK of 2700 concerning for rhabdomyolysis. Potassium 5.3, mild hyperkalemia. Lactic acidosis with a lactic acid of 5.3. Her LFTs were elevated as was her ammonia 74. INR moderately elevated 2.4. Apparently previously she been giving herself octreotide injections at home. She had previously seen palliative care at [facility].   Cancer seems terminal.  She had previously been admitted to critical care at [facility] only about 1 week 12/17/20 0928 -- -- -- 5' 7\" (1.702 m)   12/17/20 0800 92 22 97 % --         Intake/Output Summary (Last 24 hours) at 12/17/2020 1337  Last data filed at 12/17/2020 0800  Gross per 24 hour   Intake 2850 ml   Output 2345 ml   Net 505 ml     Wt Readings from Last 2 Encounters:   12/17/20 144 lb 10 oz (65.6 kg)   08/17/20 154 lb 6.4 oz (70 kg)     Body mass index is 22.65 kg/m². PHYSICAL EXAMINATION:    General appearance - ill appearing, mottled, edematous  Mental status - sedated, intubated  Eyes - pupils equal and reactive, extraocular eye movements intact, sclera anicteric  Ears - external ear normal  Nose - normal and patent, no erythema, discharge or polyps  Mouth - dry membranes moist, pharynx normal without lesions. Et tube in place  Neck - supple, no significant adenopathy  Chest -decreased breath sounds bilaterally, no wheezes, rales or rhonchi, symmetric air entry  Heart - normal rate, regular rhythm, normal S1, S2, no murmurs, rubs, clicks or gallops  Abdomen - soft, nontender, nondistended, no masses or organomegaly  Neurological - alert, oriented, normal speech, no focal findings or movement disorder noted  Extremities - peripheral pulses normal, no clubbing or cyanosis. Diffuse edema. Skin - anasarca, mottled skin. No wounds.       Any additional physical findings:    MEDICATIONS:    Scheduled Meds:   sodium zirconium cyclosilicate  10 g Oral Once    pantoprazole  40 mg Intravenous Daily    And    sodium chloride (PF)  10 mL Intravenous Daily    [START ON 12/18/2020] vancomycin  1,250 mg Intravenous Once    sodium chloride flush  10 mL Intravenous 2 times per day    heparin (porcine)  5,000 Units Subcutaneous 3 times per day    rifaximin  550 mg Per NG tube BID    cefepime  2 g Intravenous Q24H    lactulose  20 g Oral TID    cyproheptadine  4 mg Oral TID    sodium zirconium cyclosilicate  5 g Oral TID     Continuous Infusions:   norepinephrine 6 mcg/min (12/17/20 0000)    dextrose  sodium bicarbonate infusion 100 mL/hr at 12/17/20 1318    propofol Stopped (12/17/20 0857)    sodium chloride 12.5 mL/hr at 12/17/20 0934     PRN Meds:       heparin (porcine), 2,300 Units, PRN      sodium chloride flush, 10 mL, PRN      glucose, 15 g, PRN      dextrose, 12.5 g, PRN      glucagon (rDNA), 1 mg, PRN      glucose, 15 g, PRN      glucagon (rDNA), 1 mg, PRN      dextrose, 100 mL/hr, PRN      promethazine, 12.5 mg, Q6H PRN    Or      ondansetron, 4 mg, Q6H PRN      acetaminophen, 650 mg, Q6H PRN    Or      acetaminophen, 650 mg, Q6H PRN      bisacodyl, 10 mg, Daily PRN      fentanNYL, 25 mcg, Q4H PRN          VENT SETTINGS (Comprehensive) (if applicable):  Vent Information  $Ventilation: $Subsequent Day  Skin Assessment: Clean, dry, & intact  Equipment Changed: HME  Vent Type: [id number]  Vent Mode: AC/VC  Vt Ordered: 400 mL  Rate Set: 22 bmp  Peak Flow: 60 L/min  Pressure Support: 0 cmH20  FiO2 : 40 %  SpO2: 96 %  SpO2/FiO2 ratio: 240  Sensitivity: 3  PEEP/CPAP: 5  I Time/ I Time %: 0 s  Humidification Source: HME  Additional Respiratory  Assessments  Pulse: 87  Resp: 22  SpO2: 96 %  Position: Semi-Fuentes's  Humidification Source: HME  Oral Care: Mouth swabbed, Mouth moisturizer, Mouth suctioned, Suction toothette  Subglottic Suction Done?: Yes  Cuff Pressure (cm H2O): 29 cm H2O    ABGs:   ABG from December 17, 2020 shows pH 7.336 PCO2 24 PO2 of 131 bicarb of 12.5      Laboratory findings:    Complete Blood Count:   Recent Labs     12/16/20  1328 12/17/20  0540   WBC 17.8* 24.6*   HGB 13.2 13.9   HCT 39.3 39.7    284        Last 3 Blood Glucose:   Recent Labs     12/16/20 2050 12/17/20  0140 12/17/20  0540   GLUCOSE 225* 205* 177*        PT/INR:    Lab Results   Component Value Date    PROTIME 28.5 12/16/2020    INR 2.4 12/16/2020     PTT:    Lab Results   Component Value Date    APTT 43.1 12/16/2020       Comprehensive Metabolic Profile:   Recent Labs     12/16/20 2050 12/17/20  0140 12/17/20  0540   * 130* 132   K 5.4* 5.7* 5.5*    99 98   CO2 13* 11* 13*   BUN 46* 46* 47*   CREATININE 2.4* 2.5* 2.8*   GLUCOSE 225* 205* 177*   CALCIUM 8.1* 8.0* 7.9*   PROT  --   --  6.2*   LABALBU  --   --  1.7*   BILITOT  --   --  1.7*   ALKPHOS  --   --  774*   AST  --   --  1,035*   ALT  --   --  56*      Magnesium:   Lab Results   Component Value Date    MG 2.2 12/17/2020     Phosphorus:   Lab Results   Component Value Date    PHOS 7.2 12/17/2020     Ionized Calcium: No results found for: CAION     Urinalysis:     Troponin:   Recent Labs     12/16/20  1328   TROPONINI <0.01       Microbiology:    Cultures during this admission:     Blood cultures:                 [] None drawn      [x] Negative             []  Positive (Details:  )  Urine Culture:                   [] None drawn      [] Negative             []  Positive (Details:  )  Sputum Culture:               [] None drawn       [] Negative             []  Positive (Details:  )   Endotracheal aspirate:     [] None drawn       [] Negative             []  Positive (Details:  )     Other pertinent Labs: Adenovirus positive      Radiology/Imaging:   Ct Abdomen Pelvis Wo Contrast Additional Contrast? None    Result Date: 12/16/2020  EXAMINATION: CT OF THE ABDOMEN AND PELVIS WITHOUT CONTRAST 12/16/2020 2:11 pm TECHNIQUE: CT of the abdomen and pelvis was performed without the administration of intravenous contrast. Multiplanar reformatted images are provided for review. Dose modulation, iterative reconstruction, and/or weight based adjustment of the mA/kV was utilized to reduce the radiation dose to as low as reasonably achievable. COMPARISON: October 10, 2019 HISTORY: ORDERING SYSTEM PROVIDED HISTORY: altered, TECHNOLOGIST PROVIDED HISTORY: Reason for exam:->altered, Additional Contrast?->None FINDINGS: Lower Chest: Large bilateral pleural effusion. Patchy infiltrates.  Organs: Liver is enlarged with diffusely heterogeneous scattered masses. There is radiopaque density in the gravity dependent portion of the gallbladder, which may represent vicarious excretion of contrast.  The kidneys are normal in size and shape with no calcified stones or hydronephrosis. Spleen is not enlarged. The adrenal glands and pancreas are poorly defined on this non IV contrast study. GI/Bowel: No obstructing or constricting mass lesions. Pelvis: Free fluid. Bladder is collapsed with Parra catheter in place. The rectum is distended. Peritoneum/Retroperitoneum: Mild ascites. No large pathologically enlarged lymph nodes. Bones/Soft Tissues: No large lytic or blastic bony lesions. Diffuse subcutaneous edema. Progression of extensive metastases to the l enlarged liver. Interval development of ascites. New large bilateral pleural effusion and bibasilar lung infiltrates. Extensive subcutaneous edema. Ct Head Wo Contrast    Result Date: 12/16/2020  EXAMINATION: CT OF THE HEAD WITHOUT CONTRAST  12/16/2020 1:11 pm TECHNIQUE: CT of the head was performed without the administration of intravenous contrast. Dose modulation, iterative reconstruction, and/or weight based adjustment of the mA/kV was utilized to reduce the radiation dose to as low as reasonably achievable. COMPARISON: None. HISTORY: ORDERING SYSTEM PROVIDED HISTORY: AMS TECHNOLOGIST PROVIDED HISTORY: Reason for exam:->AMS Has a \"code stroke\" or \"stroke alert\" been called? ->No FINDINGS: The study is degraded moderately by motion artifact. No hemorrhage, mass or midline shift are demonstrated. Normal ventricles and sulci. Normal bony calvarium. No significant abnormal findings. Moderate image degradation from motion. Ct Chest Wo Contrast    Result Date: 12/16/2020  EXAMINATION: CT OF THE CHEST WITHOUT CONTRAST 12/16/2020 2:11 pm TECHNIQUE: CT of the chest was performed without the administration of intravenous contrast. Multiplanar reformatted images are provided for review.  Dose HISTORY: ORDERING SYSTEM PROVIDED HISTORY: ET tube placement TECHNOLOGIST PROVIDED HISTORY: Reason for exam:->ET tube placement FINDINGS: Endotracheal tube is approximately 1 cm above the sandra. NG tube courses below the diaphragm. Redemonstration of diffuse airspace opacities and loculated right pleural effusion. No pneumothorax. 1.  Endotracheal tube is approximately 1 cm above the sandra. This tube could be retracted approximately 2 cm. 2.  Diffuse airspace opacities throughout both lungs suggesting interstitial pulmonary edema or pneumonia with likely right pleural effusion. Xr Chest Portable    Result Date: 12/16/2020  EXAMINATION: ONE XRAY VIEW OF THE CHEST 12/16/2020 1:21 pm COMPARISON: Chest radiograph January 14, 2020 HISTORY: ORDERING SYSTEM PROVIDED HISTORY: altered mental, concern for sepsis TECHNOLOGIST PROVIDED HISTORY: Reason for exam:->altered mental, concern for sepsis FINDINGS: Trachea is midline. Cardiomediastinal silhouette is stable in size. There are bilateral pleural effusions measuring moderate in the right and small on the left. No pneumothorax appreciated. Moderate right-sided pleural effusion and small left-sided pleural effusion with compressive atelectasis. Xr Abdomen For Ng/og/ne Tube Placement    Result Date: 12/16/2020  EXAMINATION: ONE SUPINE XRAY VIEW(S) OF THE ABDOMEN 12/16/2020 3:42 pm COMPARISON: None. HISTORY: ORDERING SYSTEM PROVIDED HISTORY: Confirmation of course of NG/OG/NE tube and location of tip of tube TECHNOLOGIST PROVIDED HISTORY: Reason for exam:->Confirmation of course of NG/OG/NE tube and location of tip of tube Portable? ->Yes FINDINGS: The nasogastric tube is well positioned, with the tip at the level of the stomach. The liver silhouette is prominent, consistent with metastatic disease as seen on prior CT. The tip of the nasogastric tube overlies the stomach.        Chest Xray (12/17/2020):  Right-sided chest tube is more inferiorly position since the previous   examination of December 16 at 17:28 p.m. There is completely at drainage of the right-sided pleural effusion. There is lack of re-expansion of the right lung.  It forms a post pleural   effusion drainage pneumothorax of approximately 50-60%. ASSESSMENT:     Patient Active Problem List    Diagnosis Date Noted    Lactic acidosis 12/17/2020     Priority: Medium    GEMINI (acute kidney injury) ([facility] 12/16/2020     Priority: Medium    Liver metastasis ([facility] [address] 12/17/2020    Acute respiratory failure with hypoxia ([address] 12/16/2020    Depression     Pleural effusion, right 06/17/2020    Neuroendocrine carcinoma ([facility] [address] 02/27/2020    Elevated serum alkaline phosphatase level 11/27/2019    Metastatic carcinoid tumor ([facility] [address] 09/15/2019    Anxiety 06/10/2019    Current moderate episode of major depressive disorder without prior episode ([facility] [address] 06/10/2019    Anxiety disorder due to medical condition 06/10/2019       Additional assessment:    Active Problems:  1. Acute respiratory failure with hypoxia requiring mechanical ventilation  2. Septic shock  3. Extensive bilateral pleural effusions  4. Metastatic carcinoid cancer  5. Hypoglycemia  6. Lactic acidosis  7. Rhabdomyolysis   8. GEMINI  9. Hyperkalemia  10. Liver failure        PLAN:     WEAN PER PROTOCOL:  [] No   [x] Yes  [] N/A    DISCONTINUE ANY LABS:   [x] No   [] Yes    ICU PROPHYLAXIS:  Stress ulcer:  [x] PPI Agent  [] [id number] [] Sucralfate  [] Other:  VTE:   [] Enoxaparin  [x] Unfract.  Heparin Subcut  [] EPC Cuffs    NUTRITION:  [] NPO [] Tube Feeding (Specify: ) [] TPN  [x] PO (Diet: Diet NPO Effective Now)    HOME MEDICATIONS RECONCILED: [] No  [x] Yes    INSULIN DRIP:   [x] No   [] Yes    CONSULTATION NEEDED:  [] No   [x] Yes    FAMILY UPDATED:    [] No   [x] Yes    TRANSFER OUT OF ICU:   [x] No   [] Yes    ADDITIONAL PLAN:  SYSTEMS ASSESSMENT     Neuro   Intubated, sedated  Altered prior to arrival-likely secondary to hypoglycemia and acute respiratory status  CT without contrast in the ED showed no acute intercranial abnormality     Respiratory   Acute respiratory failure with hypoxia  Intubated on mechanical ventilation  Wean FiO2 as tolerated  Extensive bilateral pleural effusions              -chest tube placed yesterday with improvement in effusion, though now pneumothorax, will adjust tube  Respiratory panel positive for adenovirus  covid 19 negative  Respiratory acidosis improving - trend abg  Wean oxygen as tolerated. Keep O2 sat 90-92%     Cardiovascular   Septic shock   History of underlying valvular disease concern for fluid overload  Levophed  Maintain MAP greater than 72  Anasarca with hypoalbuminemia      Gastrointestinal   Extensive carcinoid cancer felt to be primary source in the small bowel  Cancer just diagnosed earlier last year but metastatic hepatic disease and likely terminal     Renal   GEMINI on CKD with baseline creatinine from [facility] of 1.6 presenting with creatinine of 2.6  Concern for developing rhabdomyolysis with CK of 2700  Mild hyperkalemia potassium of 5.3  Trend metabolic panel every 6 hours     Infectious Disease   Septic shock  Possible pneumonia  Respiratory panel pending  Started on vancomycin and cefepime in the emergency department  Blood cultures pending     Hematology/Oncology   Leukocytosis  Trend H&H  Daily CBC  Supratherapeutic INR not on anticoagulation     Endocrine   Hypoglycemia  Monitor blood sugar closely     Social/Spiritual/DNR/Other   Family conference today. Currently full code. Given the prognosis will recommend hospice care.     ASSESSMENT/ PLAN   1. Acute respiratory failure with hypoxia, wean ventilator settings as tolerated  2. Extensive bilateral pleural effusions requiring drainage chest tube  3. Acute renal insufficiency with hyperkalemia  4. Septic shock requiring vasopressors  5. Overall prognosis is extremely poor  6.  Family conference today to discuss plan going

## 2020-12-17 NOTE — CONSULTS
Palliative Care Department  [phone]  Palliative Care Initial Consult  Provider Kerry Meraz PA-C    [facility] [place]  [id number]  Hospital Day: 2    Referring Provider: Colin Palomino DO, Vahid Ocampo DO  Palliative Medicine was consulted for assistance with: goals of care and code status    CHIEF COMPLAINT: altered mental status    ASSESSMENT/PLAN:   Pertinent hospital diagnoses:  1. Acute hypoxic respiratory failure   -Ventilator day #2   -Right chest tube for pleural effusion   -Treatment per ICU    2. Metastatic neuroendocrine tumor with liver metastasis   -Hematology/oncology consulted   -Treatment with associated neuroendocrine symptoms    3. Liver failure   -Spread metastatic disease    4. Acute kidney injury   -Nephrology consulted    5. Sepsis   -ID consulted   -Adenovirus +   -Cultures pending   -Cefepime, vancomycin    6. Metabolic encephalopathy, likely multifactorial with elevated ammonia, respiratory issues, kidney failure and infection   -Treatment per ICU   -Lactulose 20 g 3 times a day   -Rifaximin    6. Congestive heart failure   -Repeat echocardiogram completed at outside hospital reviewed and markedly worsening valvular dysfunction and decreased EF. Changes  to tricuspid valve documented as thickened and retracted with a  carcinoid appearance and incomplete closure in systole with severe  wide-open tricuspid regurgitation. Additionally mild to moderate aortic regurgitation has developed with a trace pericardial effusion noted. PALLIATIVE CARE ENCOUNTER  - Capacity: At this time, Shayan Gramajo, Does Not have capacity for medical decision-making.   Capacity is time limited and situation/question specific  - Outcome of goals of care meeting: DNR-CC, compassionate withdrawal mechanical ventilation  - Code Status:   full  - Advanced directives: none  - Surrogate decision maker/Legal NOK: spouse Lucero Fan [phone]  - Spiritual assessment: Spiritual services consulted  - Bereavement and grief: Bereavement and grief issues with rapid decline and young age    - DISPO: Comfort    Referrals to: none today    HPI:   Remi Juarez is a 50 y.o. with a past medical history of anxiety/depression, metastatic carcinoid tumor, valvular cardiomyopathy/CHF, GEMINI who presented to the emergency department from home with altered mental status and declining performance status. Patient completed workup in the ED and was admitted on 12/16/2020 with diagnosis(ses) of acute kidney injury, nontraumatic rhabdomyolysis, hypoglycemia, hyperkalemia, lactic acidosis, hypoalbuminemia, hepatic encephalopathy, history of neuroendocrine cancer, bilateral pleural effusion, pneumonia, dehydration, acute respiratory failure with hypoxia and hypercapnia. Patient was recently discharged from an outside hospital after being admitted 12/7-12/10/202 with GEMINI and worsening performance status. She had a large amount of abdominal ascites and a right pleural effusion. Echocardiogram with worsening aortic regurgitation. Patient was treated with therapeutic paracentesis and diuresis. She was assessed by cardiology and palliative medicine. She presented to the emergency department with decreased level of consciousness eventually requiring intubation in the ED and admission to the ICU. Right chest tube was placed 12/16 for recurrent and severe pulmonary edema with 900 cc removed initially. Viral respiratory panel positive for adenovirus. Lactic acid worsening from 9.7-10.1. Albumin 1.7. LFTs significantly elevated with an AST 1035 and an ALT of 56. Ammonia level elevated to 91.5. Bilirubin elevated 1.7. Chart reviewed. Discussed with bedside RN and ICU team.  WBC is elevated to 24.6 with a left shift. Renal function worsening since presentation with a creatinine of 2.8, BUN 47 and a GFR of 18. Potassium elevated to 5.5. Chart reviewed. Discussed with bedside RN and ICU staff.     Details of Conversation: Family meeting held with Dr. Claudia Hood and ICU manager Ashley Polk. Patient's  Guy Garcia and mother were present for the meeting. We reviewed patient's medical condition and grave prognosis currently. Patient's  identified patient did not wish to live like this and has no quality of life currently. He wishes to pursue comfort for her at this point. Dr. Claudia Hood provided extensive discussion in regards to medical issues and the poor prognosis associated with her advanced liver failure and additional end organ failure with underlying cancer that is unable to be treated. Support, empathy was provided through active listening, life review, exploration of needs. Past Medical History:   Diagnosis Date    Anxiety     Depression     Liver metastasis ([facility] [address] 12/17/2020    Metastatic carcinoid tumor ([facility] 9/15/2019    Obesity    Oncologic history:  Originally diagnosed with neuroendocrine tumor of ileum with liver metastasis and she was started on Sandostatin for symptoms/treatment. Biopsy positive for metastatic well-differentiated neuroendocrine tumor. Systemic treatment started 12/2019, capecitabine and Temodar. She has developed pleural effusion requiring thoracentesis and ascites. No past surgical history on file. Inpatient medications reviewed: yes  Home Medications reviewed: yes    Allergies   Allergen Reactions    Seasonal      No family history on file.     Social history:  [place] status: no  Marital status:   Living status: with family:  spouse and mother   Work history: unknown  Tobacco use: yes  Drug use: no  Alcohol use: no    OBJECTIVE:   Prognosis: Guarded    Physical Exam:  BP (!) 100/51   Pulse 96   Temp 98.8 °F (37.1 °C) (Bladder)   Resp 26   Ht 5' 7\" (1.702 m)   Wt 144 lb 10 oz (65.6 kg)   SpO2 96%   BMI 22.65 kg/m²   Gen: Ill-appearing, intubated sedated  HEENT: Endotracheal tube, temporal wasting   neck:  Supple, trachea midline  Lungs: Mechanical ventilation, operations unlabored  Heart[de-identified]  RRR, chest tube right  Abd: Reported ascites  : Parra  Skin: Skin appears hyperpigmented/sallow  Neuro: Sedated    Objective data reviewed: labs, images, records, medication use, vitals and chart  Relevant data: Per HPI    Time/Communication  Greater than 50% of time spent, total 75 minutes in counseling and coordination of care at the bedside/over the telephone regarding goals of care, symptom management, diagnosis and prognosis and see above. Thank you for allowing Palliative Medicine to participate in the care of [facility]. Provider Ludwig Hatchet PA-C  Palliative Medicine    Note: This report was completed using computerAudiolife voiced recognition software. Every effort has been made to ensure accuracy; however, inadvertent computerized transcription errors may be present.

## 2020-12-17 NOTE — CARE COORDINATION
Pt lives with spouse and step children. Pt currently on vent support, Fio2-40%. Intensivist to discuss advance directives. Pt with extensive Ca.  Will follow-O

## 2020-12-17 NOTE — PLAN OF CARE
Problem: Inadequate oral food/beverage intake (NI-2.1)  Goal: Food and/or Nutrient Delivery  Pt will lane Nutrition progression, as approp for POC  Description: Individualized approach for food/nutrient provision.   Outcome: Ongoing

## 2020-12-17 NOTE — CONSULTS
[address] [place] [address] [facility]  Consultation Note     Admit Date: 12/16/2020 12:27 PM    Reason for Consult:       Attending Physician:  Dominique Guardado DO    HISTORY OF PRESENT ILLNESS:             The history is obtained from extensive review of available past medical records. The patient is a 50 y.o. female with a history of neuroendocrine tumor. She was recently discharged from the hospital in [place]. She presents to the ED on 12/16/2020 with altered mental status and inability to eat or drink for several days. She had a white count of 17.8. She was admitted to the ICU after receiving Vancomycin and Cefepime. No respiratory cultures were ordered. She was hypotensive. Vasopressors were started. CT of the chest showed bilateral patchy groundglass infiltrates, large bilateral pleural effusions, right greater than left. Respiratory panel, however, is positive for Adenovirus. Past Medical History:        Diagnosis Date    Anxiety     Depression     Liver metastasis ([facility] 12/17/2020    Metastatic carcinoid tumor ([facility] 9/15/2019    Obesity      Past Surgical History:    No past surgical history on file.   Current Medications:   Scheduled Meds:   sodium zirconium cyclosilicate  10 g Oral Once    pantoprazole  40 mg Intravenous Daily    And    sodium chloride (PF)  10 mL Intravenous Daily    sodium chloride flush  10 mL Intravenous 2 times per day    heparin (porcine)  5,000 Units Subcutaneous 3 times per day    rifaximin  550 mg Per NG tube BID    cefepime  2 g Intravenous Q24H    lactulose  20 g Oral TID    cyproheptadine  4 mg Oral TID    vancomycin (VANCOCIN) intermittent dosing (placeholder)   Other RX Placeholder    sodium zirconium cyclosilicate  5 g Oral TID     Continuous Infusions:   norepinephrine 6 mcg/min (12/17/20 0000)    dextrose      sodium bicarbonate infusion 100 mL/hr at 12/17/20 0250    propofol Stopped (12/17/20 0857)    sodium chloride PRN Meds:heparin (porcine), sodium chloride flush, glucose, dextrose, glucagon (rDNA), glucose, glucagon (rDNA), dextrose, promethazine **OR** ondansetron, acetaminophen **OR** acetaminophen, bisacodyl, fentanNYL    Allergies:  Seasonal    Social History:   Social History     Socioeconomic History    Marital status:      Spouse name: None    Number of children: None    Years of education: None    Highest education level: None   Occupational History    None   Social Needs    Financial resource strain: None    Food insecurity     Worry: None     Inability: None    Transportation needs     Medical: None     Non-medical: None   Tobacco Use    Smoking status: Former Smoker     Packs/day: 1.00     Years: 16.00     Pack years: 16.00     Types: Cigarettes     Quit date: 2019     Years since quittin.3    Smokeless tobacco: Never Used   Substance and Sexual Activity    Alcohol use: Never     Frequency: Never    Drug use: None    Sexual activity: None   Lifestyle    Physical activity     Days per week: None     Minutes per session: None    Stress: None   Relationships    Social connections     Talks on phone: None     Gets together: None     Attends [employer] service: None     Active member of club or organization: None     Attends meetings of clubs or organizations: None     Relationship status: None    Intimate partner violence     Fear of current or ex partner: None     Emotionally abused: None     Physically abused: None     Forced sexual activity: None   Other Topics Concern    None   Social History Narrative    None      Family History:   No family history on file. . Otherwise non-pertinent to the chief complaint. REVIEW OF SYSTEMS:    A 10 point review of systems cannot be obtained from the patient. She is intubated and sedated. Otherwise, as in the HPI.     PHYSICAL EXAM:    Vitals:   BP (!) 100/51   Pulse 92   Temp 98.8 °F (37.1 °C) (Bladder)   Resp 22   Ht 5' 7\" (1.702 m) Wt 144 lb 10 oz (65.6 kg)   SpO2 97%   BMI 22.65 kg/m²   Constitutional: The patient is lying in bed in the ICU. She is intubated and sedated. Skin: Warm and dry. Some mottling versus ecchymosis noted over the abdomen. No rashes were noted. HEENT: Eyes show round, and reactive pupils. No jaundice. Moist mucous membranes, no ulcerations, no thrush. ET tube. OG tube. Neck: Supple to movements. No lymphadenopathy. Chest: No use of accessory muscles to breathe. Symmetrical expansion. Auscultation reveals no wheezing, crackles, or rhonchi. Right chest tube with serosanguineous fluid. Cardiovascular: S1 and S2 are rhythmic and regular. No murmurs appreciated. Abdomen: Flat, firm to palpation. Diminished bowel sounds. Extremities: Edema upper extremities. Lines: Right femoral TLC 12/16/2020. Right femoral arterial line 12/16/2020. Parra catheter with clear urine.     CBC+dif:  Recent Labs     12/16/20  1328 12/17/20  0540   WBC 17.8* 24.6*   HGB 13.2 13.9   HCT 39.3 39.7   MCV 87.1 84.5    284   NEUTROABS 15.00* 20.17*     Lab Results   Component Value Date    CRP 13.0 (H) 12/16/2020      No results found for: CRPHS  Lab Results   Component Value Date    SEDRATE 2 12/16/2020    SEDRATE 10 01/14/2020     Lab Results   Component Value Date    ALT 56 (H) 12/17/2020    AST 1,035 (H) 12/17/2020    ALKPHOS 774 (H) 12/17/2020    BILITOT 1.7 (H) 12/17/2020     Lab Results   Component Value Date     12/17/2020    K 5.5 12/17/2020    K 5.7 12/17/2020    CL 98 12/17/2020    CO2 13 12/17/2020    BUN 47 12/17/2020    CREATININE 2.8 12/17/2020    GFRAA 22 12/17/2020    LABGLOM 18 12/17/2020    GLUCOSE 177 12/17/2020    PROT 6.2 12/17/2020    LABALBU 1.7 12/17/2020    CALCIUM 7.9 12/17/2020    BILITOT 1.7 12/17/2020    ALKPHOS 774 12/17/2020    AST 1,035 12/17/2020    ALT 56 12/17/2020       Lab Results   Component Value Date    PROTIME 28.5 12/16/2020    INR 2.4 12/16/2020       Lab Results   Component Value Date    TSH 5.940 12/16/2020       Lab Results   Component Value Date    COLORU Yellow 12/16/2020    PHUR 5.0 12/16/2020    WBCUA NONE 12/16/2020    RBCUA NONE 12/16/2020    BACTERIA NONE SEEN 12/16/2020    CLARITYU Clear 12/16/2020    SPECGRAV 1.020 12/16/2020    LEUKOCYTESUR Negative 12/16/2020    UROBILINOGEN 0.2 12/16/2020    BILIRUBINUR SMALL 12/16/2020    BLOODU Negative 12/16/2020    GLUCOSEU Negative 12/16/2020       Lab Results   Component Value Date    HCO3 12.5 12/17/2020    BE -11.2 12/17/2020    O2SAT 98.6 12/17/2020    PH 7.336 12/17/2020    PCO2 24.0 12/17/2020    PO2 131.7 12/17/2020     Radiology:      Microbiology:  Pending  No results for input(s): BC in the last 72 hours. No results for input(s): ORG in the last 72 hours. No results for input(s): Cletis Daft in the last 72 hours. No results for input(s): STREPNEUMAGU in the last 72 hours. No results for input(s): LP1UAG in the last 72 hours. No results for input(s): ASO in the last 72 hours. No results for input(s): CULTRESP in the last 72 hours. No results for input(s): PROCAL in the last 72 hours. Assessment:  · Neuroendocrine tumor with metastasis to liver and possibly lungs  · Probable malignant pleural effusion. Status post chest tube insertion  · Adenovirus pneumonia, possibly superimposed with bacterial pneumonia  · Acute respiratory failure  · Sepsis with septic shock  · Leukocytosis associated to the above    Plan:    · Continue Cefepime  · Check random Vancomycin level tomorrow  · Respiratory culture  · Procalcitonin  · Urine antigens  · Check cultures, baseline ESR, CRP  · Contact and droplet isolation  · Will follow with you    Thank you for having us see this patient in consultation. I will be discussing this case with the treating physicians.     Meggan Snider  10:56 AM  12/17/2020

## 2020-12-18 LAB
HAV IGM SER IA-ACNC: NORMAL
HBV SURFACE AB TITR SER: NORMAL {TITER}
HEPATITIS B CORE IGM ANTIBODY: NORMAL
HEPATITIS B SURFACE ANTIGEN INTERPRETATION: NORMAL
HEPATITIS C ANTIBODY INTERPRETATION: NORMAL
L. PNEUMOPHILA SEROGP 1 UR AG: NORMAL
STREP PNEUMONIAE ANTIGEN, URINE: NORMAL

## 2020-12-18 NOTE — DISCHARGE SUMMARY
Internal Medicine Discharge Summary    NAME: Dann Kelly :  1975  MRN:  [id number] Blanche Green MD    ADMITTED: 2020   DISCHARGED: 2020 10:20 PM    ADMITTING PHYSICIAN: Gilberto    PCP: Cash Coker MD    CONSULTANT(S):   IP CONSULT TO CRITICAL CARE  IP CONSULT TO HOSPITALIST  IP CONSULT TO NEPHROLOGY  IP CONSULT TO PALLIATIVE CARE  IP CONSULT TO INFECTIOUS DISEASES  IP CONSULT TO ONCOLOGY  IP CONSULT TO PALLIATIVE CARE  IP CONSULT TO NEPHROLOGY  IP CONSULT TO DIETITIAN  IP CONSULT TO VASCULAR SURGERY  IP CONSULT TO SPIRITUAL SERVICES  IP CONSULT TO HOSPICE     ADMITTING DIAGNOSIS:   Acute respiratory failure ([address] [J96.00]  Acute respiratory failure with hypoxia ([address] [J96.01]     Please see H&P for further details    DISCHARGE DIAGNOSES:   Active Hospital Problems    Diagnosis    Acute respiratory failure with hypoxia ([address] [J96.01]     Priority: High    Lactic acidosis [E87.2]     Priority: Medium    GEMINI (acute kidney injury) ([address] [N17.9]     Priority: Medium    Metastatic carcinoid tumor ([address] [C7B.00]     Priority: Medium    Liver metastasis ([address] [C78.7]    Depression [F32.9]    Anxiety [F41.9]    Current moderate episode of major depressive disorder without prior episode ([address] [F32.1]       BRIEF HISTORY OF PRESENT ILLNESS: Dann Kelly is a 50 y.o. female patient of Cash Coker MD who  has a past medical history of Anxiety, Depression, Liver metastasis ([address] Metastatic carcinoid tumor ([address] and Obesity. who originally had concerns including Altered Mental Status. at presentation on 2020, and was found to have Acute respiratory failure ([address] [J96.00]  Acute respiratory failure with hypoxia ([address] [J96.01] after workup. Please see H&P for further details. HOSPITAL COURSE:   The patient presented to the hospital with the chief complaint of Altered Mental Status.  The patient was admitted to the hospital.     · Acute hypoxic respiratory failure, multifactorial nature (pleuraL effusion, adenovirus pneumonia, ? Bacterial pneumonia): Terminally extubated . [facility]. Comfort meds. COVID-19 neg. · Pleural effusions, R>L: Chest tube placed on . · Neuroendocrine tumor with liver metastasis: Obtain medical records from [facility]. Palliative care consultation. · GEMINI, hyperkalemia, lactic acidosis: Nephrology. IVF hydration--bicarb. Follow lactic acid. Dialysis might be needed. · Hypoglycemia: IVF hydration with dextrose. Hypoglycemia protocol. · Sepsis of unclear etiology-possible SBP, bacterial pneumonia?: ID consultation. Cultures. Follow CBC. Pressors as needed. IVF hydration. · Metabolic encephalopathy: Possible hepatic encephalopathy: Follow ammonia level. Xifaxan via NG tube. · Follow labs  · DVT prophylaxis. · Please see orders for further management and care. ·  for discharge planning  · Discharge plan: It is unlikely she lives through this hospital stay    Terminally extubated . She  shortly after.     BRIEF PHYSICAL EXAMINATION AND LABORATORIES ON DAY OF DISCHARGE:  VITALS:  BP (!) 100/51   Pulse (!) 0   Temp 98.3 °F (36.8 °C) (Bladder)   Resp (!) 0   Ht 5' 7\" (1.702 m)   Wt 144 lb 10 oz (65.6 kg)   SpO2 (!) 81%   BMI 22.65 kg/m²     I was not able to see her on the day of death    LABS[de-identified]  Recent Labs     20  0140 20  0540 20  1250   * 132 132   K 5.7* 5.5* 4.8   CL 99 98 98   CO2 11* 13* 17*   BUN 46* 47* 46*   CREATININE 2.5* 2.8* 2.9*   GLUCOSE 205* 177* 172*   CALCIUM 8.0* 7.9* 7.5*     Recent Labs     20  1328 20  0540   ALKPHOS 750* 774*   ALT 50* 56*   * 1,035*   PROT 6.5 6.2*   BILITOT 1.4* 1.7*   BILIDIR 1.1*  --    LABALBU 1.9* 1.7*     Recent Labs     20  1328 20  0540   WBC 17.8* 24.6*   RBC 4.51 4.70   HGB 13.2 13.9   HCT 39.3 39.7   MCV 87.1 84.5   MCH 29.3 29.6   MCHC 33.6 35.0*   RDW 18.2* 18.1*    284   MPV 11.7 12.3*     Lab Results Component Value Date    LABA1C 5.5 12/17/2020     Lab Results   Component Value Date    INR 3.1 12/17/2020    INR 2.4 12/16/2020    INR 1.3 01/14/2020    PROTIME 37.6 (H) 12/17/2020    PROTIME 28.5 (H) 12/16/2020    PROTIME 15.3 (H) 01/14/2020      Lab Results   Component Value Date    TSH 5.940 (H) 12/16/2020    TSH 0.714 01/14/2020    TSH 2.170 06/10/2019     Lab Results   Component Value Date    TRIG 111 10/23/2018    HDL 56 10/23/2018    LDLCALC 147 10/23/2018     Recent Labs     12/16/20  1328 12/17/20  0540   MG 2.5 2.2       Recent Labs     12/16/20  1328 12/16/20  2050 12/17/20  0140 12/17/20  1250   CKTOTAL 2,770* 3,548* 3,085* 4,082*   TROPONINI <0.01  --   --   --       Recent Labs     12/17/20  1250   LACTA 7.0*       IMAGING:  Ct Abdomen Pelvis Wo Contrast Additional Contrast? None    Result Date: 12/16/2020  EXAMINATION: CT OF THE ABDOMEN AND PELVIS WITHOUT CONTRAST 12/16/2020 2:11 pm TECHNIQUE: CT of the abdomen and pelvis was performed without the administration of intravenous contrast. Multiplanar reformatted images are provided for review. Dose modulation, iterative reconstruction, and/or weight based adjustment of the mA/kV was utilized to reduce the radiation dose to as low as reasonably achievable. COMPARISON: October 10, 2019 HISTORY: ORDERING SYSTEM PROVIDED HISTORY: altered, TECHNOLOGIST PROVIDED HISTORY: Reason for exam:->altered, Additional Contrast?->None FINDINGS: Lower Chest: Large bilateral pleural effusion. Patchy infiltrates. Organs: Liver is enlarged with diffusely heterogeneous scattered masses. There is radiopaque density in the gravity dependent portion of the gallbladder, which may represent vicarious excretion of contrast.  The kidneys are normal in size and shape with no calcified stones or hydronephrosis. Spleen is not enlarged. The adrenal glands and pancreas are poorly defined on this non IV contrast study. GI/Bowel: No obstructing or constricting mass lesions.  Pelvis: Free fluid. Bladder is collapsed with Parra catheter in place. The rectum is distended. Peritoneum/Retroperitoneum: Mild ascites. No large pathologically enlarged lymph nodes. Bones/Soft Tissues: No large lytic or blastic bony lesions. Diffuse subcutaneous edema. Progression of extensive metastases to the l enlarged liver. Interval development of ascites. New large bilateral pleural effusion and bibasilar lung infiltrates. Extensive subcutaneous edema. Ct Head Wo Contrast    Result Date: 12/16/2020  EXAMINATION: CT OF THE HEAD WITHOUT CONTRAST  12/16/2020 1:11 pm TECHNIQUE: CT of the head was performed without the administration of intravenous contrast. Dose modulation, iterative reconstruction, and/or weight based adjustment of the mA/kV was utilized to reduce the radiation dose to as low as reasonably achievable. COMPARISON: None. HISTORY: ORDERING SYSTEM PROVIDED HISTORY: AMS TECHNOLOGIST PROVIDED HISTORY: Reason for exam:->AMS Has a \"code stroke\" or \"stroke alert\" been called? ->No FINDINGS: The study is degraded moderately by motion artifact. No hemorrhage, mass or midline shift are demonstrated. Normal ventricles and sulci. Normal bony calvarium. No significant abnormal findings. Moderate image degradation from motion. Ct Chest Wo Contrast    Result Date: 12/16/2020  EXAMINATION: CT OF THE CHEST WITHOUT CONTRAST 12/16/2020 2:11 pm TECHNIQUE: CT of the chest was performed without the administration of intravenous contrast. Multiplanar reformatted images are provided for review. Dose modulation, iterative reconstruction, and/or weight based adjustment of the mA/kV was utilized to reduce the radiation dose to as low as reasonably achievable. COMPARISON: Previous CT scan of the abdomen of 10/10/2019 HISTORY: ORDERING SYSTEM PROVIDED HISTORY: altered, hypoxic TECHNOLOGIST PROVIDED HISTORY: Reason for exam:->altered, hypoxic FINDINGS: Mediastinum: The heart is normal in size.   The thoracic aorta is normal caliber. Gross mediastinal lymphadenopathy is not appreciated. Lungs/pleura: There are very large bilateral pleural effusions, right greater than left. There is complete collapse of the right lower lobe. Multifocal bilateral ground-glass and semi solid infiltrates are noted consistent with COVID pneumonia. Upper Abdomen: The abdomen will be dictated separately. There is extensive metastatic disease throughout the liver and it is doubtful that there is any normal residual liver parenchyma. Soft Tissues/Bones: Gross metastatic disease to the thoracic spine is not appreciated. There is no compression fracture. 1. Very large bilateral pleural effusions, right greater than left. The right pleural effusion occupies at least 70-75% of the right hemithorax. 2. Multifocal bilateral ground-glass and semi solid pulmonary infiltrates most consistent with COVID-19 pneumonia 3. Compression atelectasis of the right lower lobe with collapse of the right lower lobe. 4. Extensive metastatic disease to liver. There is virtually no residual normal liver parenchyma. Xr Chest Portable    Result Date: 12/17/2020  EXAMINATION: ONE XRAY VIEW OF THE CHEST 12/17/2020 11:29 am COMPARISON: Comparison with studies of a December 16 before and after placement of a right-sided chest tube. HISTORY: ORDERING SYSTEM PROVIDED HISTORY: respiratory failure TECHNOLOGIST PROVIDED HISTORY: Reason for exam:->respiratory failure FINDINGS: Right-sided chest tube is located the towards the inner mid aspect of the mid lower 3rd of the right lung the position is inferiorly since the previous examination but the medially located. There is vision complete drainage of the right-sided pleural effusion but there is an lack of expansion of the right lung forming a post pleural effusion draining pneumothorax of approximately 50-60%. There is no conspicuous midline shift to the left. There is diffuse ground-glass density in the left lung.   If pleural effusion is present on the left is difficult to be quantified. On the CT scan examination of December 16 there where a moderate to large size left-sided pleural effusion. Bedside ultrasound or left lateral decubitus views can be helpful to quantify pleural effusion on the left. NG tube is in good position in the stomach. There is oral contrast in the stomach. Endotracheal tube is just proximal to the upper contour of the arch of the aorta. Right-sided chest tube is more inferiorly position since the previous examination of December 16 at 17:28 p.m. There is completely at drainage of the right-sided pleural effusion. There is lack of re-expansion of the right lung. It forms a post pleural effusion drainage pneumothorax of approximately 50-60%. Xr Chest Portable    Result Date: 12/16/2020  EXAMINATION: ONE XRAY VIEW OF THE CHEST 12/16/2020 5:39 pm COMPARISON: December 16, 2020, 1 hour prior HISTORY: ORDERING SYSTEM PROVIDED HISTORY: chest tube placement TECHNOLOGIST PROVIDED HISTORY: Reason for exam:->chest tube placement FINDINGS: Tip of ETT 2.2 cm above the sandra. NG tube tip below the diaphragm. Pigtail catheter projected over the right hemithorax. Large right pleural effusion. Bilateral infiltrates noted. The heart is not enlarged. There is no pneumothorax. Tubes and catheters as noted. Large right pleural effusion, improved. Bilateral infiltrates. Xr Chest Portable    Result Date: 12/16/2020  EXAMINATION: ONE XRAY VIEW OF THE CHEST 12/16/2020 3:42 pm COMPARISON: December 16, 2020 HISTORY: ORDERING SYSTEM PROVIDED HISTORY: ET tube placement TECHNOLOGIST PROVIDED HISTORY: Reason for exam:->ET tube placement FINDINGS: Endotracheal tube is approximately 1 cm above the sandra. NG tube courses below the diaphragm. Redemonstration of diffuse airspace opacities and loculated right pleural effusion. No pneumothorax. 1.  Endotracheal tube is approximately 1 cm above the sandra.   This tube

## 2020-12-18 NOTE — DISCHARGE SUMMARY
Internal Medicine Discharge Summary    NAME: Lenora Miller :  1975  MRN:  [id number] Celia Zayas MD    ADMITTED: 2020   DISCHARGED: 2020 10:20 PM    ADMITTING PHYSICIAN: Gilberto    PCP: Tj Cherry MD    CONSULTANT(S):   IP CONSULT TO CRITICAL CARE  IP CONSULT TO HOSPITALIST  IP CONSULT TO NEPHROLOGY  IP CONSULT TO PALLIATIVE CARE  IP CONSULT TO INFECTIOUS DISEASES  IP CONSULT TO ONCOLOGY  IP CONSULT TO PALLIATIVE CARE  IP CONSULT TO NEPHROLOGY  IP CONSULT TO DIETITIAN  IP CONSULT TO VASCULAR SURGERY  IP CONSULT TO SPIRITUAL SERVICES  IP CONSULT TO HOSPICE     ADMITTING DIAGNOSIS:   Acute respiratory failure ([address] [J96.00]  Acute respiratory failure with hypoxia ([address] [J96.01]     Please see H&P for further details    DISCHARGE DIAGNOSES:   Active Hospital Problems    Diagnosis    Acute respiratory failure with hypoxia ([address] [J96.01]     Priority: High    Lactic acidosis [E87.2]     Priority: Medium    GEMINI (acute kidney injury) ([address] [N17.9]     Priority: Medium    Metastatic carcinoid tumor ([address] [C7B.00]     Priority: Medium    Liver metastasis ([address] [C78.7]    Depression [F32.9]    Anxiety [F41.9]    Current moderate episode of major depressive disorder without prior episode ([address] [F32.1]       BRIEF HISTORY OF PRESENT ILLNESS: Lenora Miller is a 50 y.o. female patient of Tj Cherry MD who  has a past medical history of Anxiety, Depression, Liver metastasis ([address] Metastatic carcinoid tumor ([address] and Obesity. who originally had concerns including Altered Mental Status. at presentation on 2020, and was found to have Acute respiratory failure ([address] [J96.00]  Acute respiratory failure with hypoxia ([address] [J96.01] after workup. Please see H&P for further details. HOSPITAL COURSE:   The patient presented to the hospital with the chief complaint of Altered Mental Status.  The patient was admitted to the hospital.     · Acute hypoxic respiratory failure, multifactorial Free fluid. Bladder is collapsed with Parra catheter in place. The rectum is distended. Peritoneum/Retroperitoneum: Mild ascites. No large pathologically enlarged lymph nodes. Bones/Soft Tissues: No large lytic or blastic bony lesions. Diffuse subcutaneous edema. Progression of extensive metastases to the l enlarged liver. Interval development of ascites. New large bilateral pleural effusion and bibasilar lung infiltrates. Extensive subcutaneous edema. Ct Head Wo Contrast    Result Date: 12/16/2020  EXAMINATION: CT OF THE HEAD WITHOUT CONTRAST  12/16/2020 1:11 pm TECHNIQUE: CT of the head was performed without the administration of intravenous contrast. Dose modulation, iterative reconstruction, and/or weight based adjustment of the mA/kV was utilized to reduce the radiation dose to as low as reasonably achievable. COMPARISON: None. HISTORY: ORDERING SYSTEM PROVIDED HISTORY: AMS TECHNOLOGIST PROVIDED HISTORY: Reason for exam:->AMS Has a \"code stroke\" or \"stroke alert\" been called? ->No FINDINGS: The study is degraded moderately by motion artifact. No hemorrhage, mass or midline shift are demonstrated. Normal ventricles and sulci. Normal bony calvarium. No significant abnormal findings. Moderate image degradation from motion. Ct Chest Wo Contrast    Result Date: 12/16/2020  EXAMINATION: CT OF THE CHEST WITHOUT CONTRAST 12/16/2020 2:11 pm TECHNIQUE: CT of the chest was performed without the administration of intravenous contrast. Multiplanar reformatted images are provided for review. Dose modulation, iterative reconstruction, and/or weight based adjustment of the mA/kV was utilized to reduce the radiation dose to as low as reasonably achievable. COMPARISON: Previous CT scan of the abdomen of 10/10/2019 HISTORY: ORDERING SYSTEM PROVIDED HISTORY: altered, hypoxic TECHNOLOGIST PROVIDED HISTORY: Reason for exam:->altered, hypoxic FINDINGS: Mediastinum: The heart is normal in size.   The thoracic aorta is normal caliber. Gross mediastinal lymphadenopathy is not appreciated. Lungs/pleura: There are very large bilateral pleural effusions, right greater than left. There is complete collapse of the right lower lobe. Multifocal bilateral ground-glass and semi solid infiltrates are noted consistent with COVID pneumonia. Upper Abdomen: The abdomen will be dictated separately. There is extensive metastatic disease throughout the liver and it is doubtful that there is any normal residual liver parenchyma. Soft Tissues/Bones: Gross metastatic disease to the thoracic spine is not appreciated. There is no compression fracture. 1. Very large bilateral pleural effusions, right greater than left. The right pleural effusion occupies at least 70-75% of the right hemithorax. 2. Multifocal bilateral ground-glass and semi solid pulmonary infiltrates most consistent with COVID-19 pneumonia 3. Compression atelectasis of the right lower lobe with collapse of the right lower lobe. 4. Extensive metastatic disease to liver. There is virtually no residual normal liver parenchyma. Xr Chest Portable    Result Date: 12/17/2020  EXAMINATION: ONE XRAY VIEW OF THE CHEST 12/17/2020 11:29 am COMPARISON: Comparison with studies of a December 16 before and after placement of a right-sided chest tube. HISTORY: ORDERING SYSTEM PROVIDED HISTORY: respiratory failure TECHNOLOGIST PROVIDED HISTORY: Reason for exam:->respiratory failure FINDINGS: Right-sided chest tube is located the towards the inner mid aspect of the mid lower 3rd of the right lung the position is inferiorly since the previous examination but the medially located. There is vision complete drainage of the right-sided pleural effusion but there is an lack of expansion of the right lung forming a post pleural effusion draining pneumothorax of approximately 50-60%. There is no conspicuous midline shift to the left. There is diffuse ground-glass density in the left lung.   If could be retracted approximately 2 cm. 2.  Diffuse airspace opacities throughout both lungs suggesting interstitial pulmonary edema or pneumonia with likely right pleural effusion. Xr Chest Portable    Result Date: 2020  EXAMINATION: ONE XRAY VIEW OF THE CHEST 2020 1:21 pm COMPARISON: Chest radiograph 2020 HISTORY: ORDERING SYSTEM PROVIDED HISTORY: altered mental, concern for sepsis TECHNOLOGIST PROVIDED HISTORY: Reason for exam:->altered mental, concern for sepsis FINDINGS: Trachea is midline. Cardiomediastinal silhouette is stable in size. There are bilateral pleural effusions measuring moderate in the right and small on the left. No pneumothorax appreciated. Moderate right-sided pleural effusion and small left-sided pleural effusion with compressive atelectasis. Xr Abdomen For Ng/og/ne Tube Placement    Result Date: 2020  EXAMINATION: ONE SUPINE XRAY VIEW(S) OF THE ABDOMEN 2020 3:42 pm COMPARISON: None. HISTORY: ORDERING SYSTEM PROVIDED HISTORY: Confirmation of course of NG/OG/NE tube and location of tip of tube TECHNOLOGIST PROVIDED HISTORY: Reason for exam:->Confirmation of course of NG/OG/NE tube and location of tip of tube Portable? ->Yes FINDINGS: The nasogastric tube is well positioned, with the tip at the level of the stomach. The liver silhouette is prominent, consistent with metastatic disease as seen on prior CT. The tip of the nasogastric tube overlies the stomach. MICROBIOLOGY:  BLOOD CX #1  Recent Labs     20  1558   BC 24 Hours no growth     BLOOD CX #2  Recent Labs     20  1558   BLOODCULT2 24 Hours no growth     TIP CULTURE  No results for input(s): CXCATHTIP in the last 72 hours. CULTURE, RESPIRATORY   No results for input(s): CULTRESP in the last 72 hours. RESPIRATORY SMEAR  No results for input(s): RESPSMEAR in the last 72 hours.        DISPOSITION:  She  during this hospital stay    Preparing for this patient's discharge, including paperwork, orders, instructions, and meeting with patient did required 34 minutes. Electronically signed by Iman Mota DO on 12/17/2020 at 9:04 PM    I can be reached through [facility].

## 2020-12-18 NOTE — PROGRESS NOTES
Patient found without pulse, respirations, or blood pressure. Pupils fixed and unresponsive. This was verified by 2 RNs. Patient's  at bedside at time of death.

## 2020-12-18 NOTE — PROGRESS NOTES
Call made to [id number] -permission given to release.   Ref# [id number]

## 2020-12-19 LAB
BODY FLUID CULTURE, STERILE: NORMAL
GRAM STAIN RESULT: NORMAL
MRSA CULTURE ONLY: NORMAL

## 2020-12-21 LAB
BLOOD CULTURE, ROUTINE: NORMAL
CULTURE, BLOOD 2: NORMAL

## 2020-12-22 LAB
REPORT: NORMAL
THIS TEST SENT TO: NORMAL